# Patient Record
Sex: MALE | Race: WHITE | NOT HISPANIC OR LATINO | Employment: OTHER | URBAN - METROPOLITAN AREA
[De-identification: names, ages, dates, MRNs, and addresses within clinical notes are randomized per-mention and may not be internally consistent; named-entity substitution may affect disease eponyms.]

---

## 2021-01-14 DIAGNOSIS — Z23 NEED FOR VACCINATION: ICD-10-CM

## 2021-02-10 ENCOUNTER — IMMUNIZATION (OUTPATIENT)
Dept: FAMILY PLANNING/WOMEN'S HEALTH CLINIC | Facility: IMMUNIZATION CENTER | Age: 76
End: 2021-02-10
Payer: MEDICARE

## 2021-02-10 ENCOUNTER — APPOINTMENT (OUTPATIENT)
Dept: FAMILY PLANNING/WOMEN'S HEALTH CLINIC | Facility: IMMUNIZATION CENTER | Age: 76
End: 2021-02-10
Attending: INTERNAL MEDICINE
Payer: MEDICARE

## 2021-02-10 DIAGNOSIS — Z23 ENCOUNTER FOR VACCINATION: Primary | ICD-10-CM

## 2021-02-10 DIAGNOSIS — Z23 NEED FOR VACCINATION: ICD-10-CM

## 2021-02-10 PROCEDURE — 0001A PFIZER SARS-COV-2 VACCINE: CPT

## 2021-02-10 PROCEDURE — 91300 PFIZER SARS-COV-2 VACCINE: CPT

## 2021-03-03 ENCOUNTER — IMMUNIZATION (OUTPATIENT)
Dept: FAMILY PLANNING/WOMEN'S HEALTH CLINIC | Facility: IMMUNIZATION CENTER | Age: 76
End: 2021-03-03
Attending: INTERNAL MEDICINE
Payer: MEDICARE

## 2021-03-03 DIAGNOSIS — Z23 ENCOUNTER FOR VACCINATION: Primary | ICD-10-CM

## 2021-03-03 PROCEDURE — 0002A PFIZER SARS-COV-2 VACCINE: CPT

## 2021-03-03 PROCEDURE — 91300 PFIZER SARS-COV-2 VACCINE: CPT

## 2022-01-01 ENCOUNTER — HOSPITAL ENCOUNTER (INPATIENT)
Facility: MEDICAL CENTER | Age: 77
LOS: 7 days | DRG: 432 | End: 2022-04-02
Attending: EMERGENCY MEDICINE | Admitting: FAMILY MEDICINE
Payer: MEDICARE

## 2022-01-01 ENCOUNTER — APPOINTMENT (OUTPATIENT)
Dept: RADIOLOGY | Facility: MEDICAL CENTER | Age: 77
DRG: 432 | End: 2022-01-01
Attending: STUDENT IN AN ORGANIZED HEALTH CARE EDUCATION/TRAINING PROGRAM
Payer: MEDICARE

## 2022-01-01 ENCOUNTER — APPOINTMENT (OUTPATIENT)
Dept: CARDIOLOGY | Facility: MEDICAL CENTER | Age: 77
DRG: 432 | End: 2022-01-01
Attending: STUDENT IN AN ORGANIZED HEALTH CARE EDUCATION/TRAINING PROGRAM
Payer: MEDICARE

## 2022-01-01 ENCOUNTER — APPOINTMENT (OUTPATIENT)
Dept: RADIOLOGY | Facility: MEDICAL CENTER | Age: 77
DRG: 432 | End: 2022-01-01
Attending: FAMILY MEDICINE
Payer: MEDICARE

## 2022-01-01 ENCOUNTER — APPOINTMENT (OUTPATIENT)
Dept: RADIOLOGY | Facility: MEDICAL CENTER | Age: 77
DRG: 432 | End: 2022-01-01
Attending: EMERGENCY MEDICINE
Payer: MEDICARE

## 2022-01-01 VITALS
BODY MASS INDEX: 25.73 KG/M2 | HEART RATE: 83 BPM | WEIGHT: 190 LBS | TEMPERATURE: 98.2 F | RESPIRATION RATE: 19 BRPM | SYSTOLIC BLOOD PRESSURE: 105 MMHG | DIASTOLIC BLOOD PRESSURE: 67 MMHG | HEIGHT: 72 IN | OXYGEN SATURATION: 93 %

## 2022-01-01 DIAGNOSIS — R29.6 FREQUENT FALLS: ICD-10-CM

## 2022-01-01 DIAGNOSIS — K74.60 HEPATIC CIRRHOSIS, UNSPECIFIED HEPATIC CIRRHOSIS TYPE, UNSPECIFIED WHETHER ASCITES PRESENT (HCC): ICD-10-CM

## 2022-01-01 DIAGNOSIS — K21.9 CHRONIC GERD: ICD-10-CM

## 2022-01-01 DIAGNOSIS — R18.8 OTHER ASCITES: ICD-10-CM

## 2022-01-01 DIAGNOSIS — R26.2 UNABLE TO WALK: ICD-10-CM

## 2022-01-01 DIAGNOSIS — G45.9 TIA (TRANSIENT ISCHEMIC ATTACK): ICD-10-CM

## 2022-01-01 DIAGNOSIS — R53.1 WEAKNESS: ICD-10-CM

## 2022-01-01 DIAGNOSIS — D64.9 ANEMIA, UNSPECIFIED TYPE: ICD-10-CM

## 2022-01-01 LAB
ABO + RH BLD: NORMAL
ABO GROUP BLD: NORMAL
ALBUMIN FLD-MCNC: 0.7 G/DL
ALBUMIN SERPL BCP-MCNC: 1.9 G/DL (ref 3.2–4.9)
ALBUMIN SERPL BCP-MCNC: 1.9 G/DL (ref 3.2–4.9)
ALBUMIN SERPL BCP-MCNC: 2 G/DL (ref 3.2–4.9)
ALBUMIN SERPL BCP-MCNC: 2.1 G/DL (ref 3.2–4.9)
ALBUMIN SERPL BCP-MCNC: 2.2 G/DL (ref 3.2–4.9)
ALBUMIN SERPL BCP-MCNC: 2.5 G/DL (ref 3.2–4.9)
ALBUMIN SERPL BCP-MCNC: 2.6 G/DL (ref 3.2–4.9)
ALBUMIN/GLOB SERPL: 0.6 G/DL
ALBUMIN/GLOB SERPL: 0.7 G/DL
ALBUMIN/GLOB SERPL: 0.7 G/DL
ALP SERPL-CCNC: 117 U/L (ref 30–99)
ALP SERPL-CCNC: 54 U/L (ref 30–99)
ALP SERPL-CCNC: 57 U/L (ref 30–99)
ALP SERPL-CCNC: 58 U/L (ref 30–99)
ALP SERPL-CCNC: 71 U/L (ref 30–99)
ALP SERPL-CCNC: 74 U/L (ref 30–99)
ALP SERPL-CCNC: 77 U/L (ref 30–99)
ALP SERPL-CCNC: 85 U/L (ref 30–99)
ALP SERPL-CCNC: 87 U/L (ref 30–99)
ALT SERPL-CCNC: 10 U/L (ref 2–50)
ALT SERPL-CCNC: 10 U/L (ref 2–50)
ALT SERPL-CCNC: 11 U/L (ref 2–50)
ALT SERPL-CCNC: 17 U/L (ref 2–50)
ALT SERPL-CCNC: 7 U/L (ref 2–50)
ALT SERPL-CCNC: 8 U/L (ref 2–50)
ALT SERPL-CCNC: 9 U/L (ref 2–50)
AMMONIA PLAS-SCNC: 35 UMOL/L (ref 11–45)
AMPHET UR QL SCN: NEGATIVE
ANION GAP SERPL CALC-SCNC: 14 MMOL/L (ref 7–16)
ANION GAP SERPL CALC-SCNC: 4 MMOL/L (ref 7–16)
ANION GAP SERPL CALC-SCNC: 5 MMOL/L (ref 7–16)
ANION GAP SERPL CALC-SCNC: 5 MMOL/L (ref 7–16)
ANION GAP SERPL CALC-SCNC: 6 MMOL/L (ref 7–16)
ANION GAP SERPL CALC-SCNC: 8 MMOL/L (ref 7–16)
ANION GAP SERPL CALC-SCNC: 9 MMOL/L (ref 7–16)
ANION GAP SERPL CALC-SCNC: 9 MMOL/L (ref 7–16)
APPEARANCE FLD: CLEAR
APTT PPP: 34 SEC (ref 24.7–36)
AST SERPL-CCNC: 18 U/L (ref 12–45)
AST SERPL-CCNC: 18 U/L (ref 12–45)
AST SERPL-CCNC: 19 U/L (ref 12–45)
AST SERPL-CCNC: 19 U/L (ref 12–45)
AST SERPL-CCNC: 21 U/L (ref 12–45)
AST SERPL-CCNC: 24 U/L (ref 12–45)
AST SERPL-CCNC: 27 U/L (ref 12–45)
AST SERPL-CCNC: 32 U/L (ref 12–45)
AST SERPL-CCNC: 37 U/L (ref 12–45)
BACTERIA FLD AEROBE CULT: NORMAL
BARBITURATES UR QL SCN: NEGATIVE
BASOPHILS # BLD AUTO: 0 % (ref 0–1.8)
BASOPHILS # BLD AUTO: 0.2 % (ref 0–1.8)
BASOPHILS # BLD AUTO: 0.2 % (ref 0–1.8)
BASOPHILS # BLD: 0 K/UL (ref 0–0.12)
BASOPHILS # BLD: 0.01 K/UL (ref 0–0.12)
BASOPHILS # BLD: 0.02 K/UL (ref 0–0.12)
BASOPHILS NFR FLD: 1 %
BENZODIAZ UR QL SCN: NEGATIVE
BILIRUB SERPL-MCNC: 0.6 MG/DL (ref 0.1–1.5)
BILIRUB SERPL-MCNC: 0.7 MG/DL (ref 0.1–1.5)
BILIRUB SERPL-MCNC: 0.7 MG/DL (ref 0.1–1.5)
BILIRUB SERPL-MCNC: 0.8 MG/DL (ref 0.1–1.5)
BILIRUB SERPL-MCNC: 0.9 MG/DL (ref 0.1–1.5)
BILIRUB SERPL-MCNC: 1.1 MG/DL (ref 0.1–1.5)
BILIRUB SERPL-MCNC: 1.2 MG/DL (ref 0.1–1.5)
BILIRUB SERPL-MCNC: 1.3 MG/DL (ref 0.1–1.5)
BILIRUB SERPL-MCNC: 1.7 MG/DL (ref 0.1–1.5)
BLD GP AB SCN SERPL QL: NORMAL
BODY FLD TYPE: NORMAL
BODY FLD TYPE: NORMAL
BUN SERPL-MCNC: 14 MG/DL (ref 8–22)
BUN SERPL-MCNC: 20 MG/DL (ref 8–22)
BUN SERPL-MCNC: 21 MG/DL (ref 8–22)
BUN SERPL-MCNC: 21 MG/DL (ref 8–22)
BUN SERPL-MCNC: 23 MG/DL (ref 8–22)
BUN SERPL-MCNC: 25 MG/DL (ref 8–22)
BUN SERPL-MCNC: 29 MG/DL (ref 8–22)
BUN SERPL-MCNC: 31 MG/DL (ref 8–22)
BUN SERPL-MCNC: 34 MG/DL (ref 8–22)
BUN SERPL-MCNC: 37 MG/DL (ref 8–22)
BZE UR QL SCN: NEGATIVE
CALCIUM SERPL-MCNC: 7.7 MG/DL (ref 8.5–10.5)
CALCIUM SERPL-MCNC: 8 MG/DL (ref 8.5–10.5)
CALCIUM SERPL-MCNC: 8 MG/DL (ref 8.5–10.5)
CALCIUM SERPL-MCNC: 8.1 MG/DL (ref 8.5–10.5)
CALCIUM SERPL-MCNC: 8.2 MG/DL (ref 8.5–10.5)
CALCIUM SERPL-MCNC: 8.3 MG/DL (ref 8.5–10.5)
CALCIUM SERPL-MCNC: 8.6 MG/DL (ref 8.5–10.5)
CALCIUM SERPL-MCNC: 8.9 MG/DL (ref 8.5–10.5)
CANNABINOIDS UR QL SCN: NEGATIVE
CHLORIDE SERPL-SCNC: 101 MMOL/L (ref 96–112)
CHLORIDE SERPL-SCNC: 102 MMOL/L (ref 96–112)
CHLORIDE SERPL-SCNC: 102 MMOL/L (ref 96–112)
CHLORIDE SERPL-SCNC: 103 MMOL/L (ref 96–112)
CHLORIDE SERPL-SCNC: 104 MMOL/L (ref 96–112)
CHLORIDE SERPL-SCNC: 105 MMOL/L (ref 96–112)
CHLORIDE SERPL-SCNC: 106 MMOL/L (ref 96–112)
CHLORIDE SERPL-SCNC: 106 MMOL/L (ref 96–112)
CHLORIDE SERPL-SCNC: 107 MMOL/L (ref 96–112)
CHLORIDE SERPL-SCNC: 98 MMOL/L (ref 96–112)
CO2 SERPL-SCNC: 23 MMOL/L (ref 20–33)
CO2 SERPL-SCNC: 26 MMOL/L (ref 20–33)
CO2 SERPL-SCNC: 27 MMOL/L (ref 20–33)
CO2 SERPL-SCNC: 28 MMOL/L (ref 20–33)
CO2 SERPL-SCNC: 30 MMOL/L (ref 20–33)
CO2 SERPL-SCNC: 30 MMOL/L (ref 20–33)
COLOR FLD: YELLOW
CREAT SERPL-MCNC: 0.52 MG/DL (ref 0.5–1.4)
CREAT SERPL-MCNC: 0.58 MG/DL (ref 0.5–1.4)
CREAT SERPL-MCNC: 0.71 MG/DL (ref 0.5–1.4)
CREAT SERPL-MCNC: 0.81 MG/DL (ref 0.5–1.4)
CREAT SERPL-MCNC: 0.81 MG/DL (ref 0.5–1.4)
CREAT SERPL-MCNC: 0.82 MG/DL (ref 0.5–1.4)
CREAT SERPL-MCNC: 0.83 MG/DL (ref 0.5–1.4)
CREAT SERPL-MCNC: 0.85 MG/DL (ref 0.5–1.4)
CRP SERPL HS-MCNC: 11.23 MG/DL (ref 0–0.75)
CSF COMMENTS 1658: NORMAL
EOSINOPHIL # BLD AUTO: 0 K/UL (ref 0–0.51)
EOSINOPHIL # BLD AUTO: 0.02 K/UL (ref 0–0.51)
EOSINOPHIL # BLD AUTO: 0.02 K/UL (ref 0–0.51)
EOSINOPHIL NFR BLD: 0 % (ref 0–6.9)
EOSINOPHIL NFR BLD: 0.3 % (ref 0–6.9)
EOSINOPHIL NFR BLD: 0.3 % (ref 0–6.9)
ERYTHROCYTE [DISTWIDTH] IN BLOOD BY AUTOMATED COUNT: 50.8 FL (ref 35.9–50)
ERYTHROCYTE [DISTWIDTH] IN BLOOD BY AUTOMATED COUNT: 52.7 FL (ref 35.9–50)
ERYTHROCYTE [DISTWIDTH] IN BLOOD BY AUTOMATED COUNT: 52.8 FL (ref 35.9–50)
ERYTHROCYTE [DISTWIDTH] IN BLOOD BY AUTOMATED COUNT: 52.9 FL (ref 35.9–50)
ERYTHROCYTE [DISTWIDTH] IN BLOOD BY AUTOMATED COUNT: 53.3 FL (ref 35.9–50)
ERYTHROCYTE [DISTWIDTH] IN BLOOD BY AUTOMATED COUNT: 53.4 FL (ref 35.9–50)
ERYTHROCYTE [DISTWIDTH] IN BLOOD BY AUTOMATED COUNT: 54.2 FL (ref 35.9–50)
ERYTHROCYTE [DISTWIDTH] IN BLOOD BY AUTOMATED COUNT: 54.8 FL (ref 35.9–50)
ERYTHROCYTE [DISTWIDTH] IN BLOOD BY AUTOMATED COUNT: 56 FL (ref 35.9–50)
ETHANOL BLD-MCNC: <10.1 MG/DL (ref 0–10)
GFR SERPLBLD CREATININE-BSD FMLA CKD-EPI: 100 ML/MIN/1.73 M 2
GFR SERPLBLD CREATININE-BSD FMLA CKD-EPI: 104 ML/MIN/1.73 M 2
GFR SERPLBLD CREATININE-BSD FMLA CKD-EPI: 89 ML/MIN/1.73 M 2
GFR SERPLBLD CREATININE-BSD FMLA CKD-EPI: 90 ML/MIN/1.73 M 2
GFR SERPLBLD CREATININE-BSD FMLA CKD-EPI: 90 ML/MIN/1.73 M 2
GFR SERPLBLD CREATININE-BSD FMLA CKD-EPI: 91 ML/MIN/1.73 M 2
GFR SERPLBLD CREATININE-BSD FMLA CKD-EPI: 91 ML/MIN/1.73 M 2
GFR SERPLBLD CREATININE-BSD FMLA CKD-EPI: 94 ML/MIN/1.73 M 2
GLOBULIN SER CALC-MCNC: 3.3 G/DL (ref 1.9–3.5)
GLOBULIN SER CALC-MCNC: 3.5 G/DL (ref 1.9–3.5)
GLOBULIN SER CALC-MCNC: 3.5 G/DL (ref 1.9–3.5)
GLOBULIN SER CALC-MCNC: 3.7 G/DL (ref 1.9–3.5)
GLOBULIN SER CALC-MCNC: 3.9 G/DL (ref 1.9–3.5)
GLOBULIN SER CALC-MCNC: 4.7 G/DL (ref 1.9–3.5)
GLUCOSE SERPL-MCNC: 100 MG/DL (ref 65–99)
GLUCOSE SERPL-MCNC: 102 MG/DL (ref 65–99)
GLUCOSE SERPL-MCNC: 116 MG/DL (ref 65–99)
GLUCOSE SERPL-MCNC: 116 MG/DL (ref 65–99)
GLUCOSE SERPL-MCNC: 126 MG/DL (ref 65–99)
GLUCOSE SERPL-MCNC: 139 MG/DL (ref 65–99)
GLUCOSE SERPL-MCNC: 152 MG/DL (ref 65–99)
GLUCOSE SERPL-MCNC: 153 MG/DL (ref 65–99)
GLUCOSE SERPL-MCNC: 80 MG/DL (ref 65–99)
GLUCOSE SERPL-MCNC: 97 MG/DL (ref 65–99)
GRAM STN SPEC: NORMAL
GRAM STN SPEC: NORMAL
HCT VFR BLD AUTO: 23.7 % (ref 42–52)
HCT VFR BLD AUTO: 23.8 % (ref 42–52)
HCT VFR BLD AUTO: 24 % (ref 42–52)
HCT VFR BLD AUTO: 24.5 % (ref 42–52)
HCT VFR BLD AUTO: 26.4 % (ref 42–52)
HCT VFR BLD AUTO: 26.4 % (ref 42–52)
HCT VFR BLD AUTO: 27 % (ref 42–52)
HCT VFR BLD AUTO: 27.1 % (ref 42–52)
HCT VFR BLD AUTO: 36.9 % (ref 42–52)
HGB BLD-MCNC: 12.2 G/DL (ref 14–18)
HGB BLD-MCNC: 7.6 G/DL (ref 14–18)
HGB BLD-MCNC: 7.7 G/DL (ref 14–18)
HGB BLD-MCNC: 7.9 G/DL (ref 14–18)
HGB BLD-MCNC: 7.9 G/DL (ref 14–18)
HGB BLD-MCNC: 8.5 G/DL (ref 14–18)
HGB BLD-MCNC: 8.6 G/DL (ref 14–18)
HGB BLD-MCNC: 8.6 G/DL (ref 14–18)
HGB BLD-MCNC: 8.7 G/DL (ref 14–18)
HISTIOCYTES NFR FLD: 3 %
IMM GRANULOCYTES # BLD AUTO: 0.01 K/UL (ref 0–0.11)
IMM GRANULOCYTES # BLD AUTO: 0.02 K/UL (ref 0–0.11)
IMM GRANULOCYTES # BLD AUTO: 0.04 K/UL (ref 0–0.11)
IMM GRANULOCYTES NFR BLD AUTO: 0.2 % (ref 0–0.9)
IMM GRANULOCYTES NFR BLD AUTO: 0.3 % (ref 0–0.9)
IMM GRANULOCYTES NFR BLD AUTO: 0.4 % (ref 0–0.9)
INR PPP: 1.34 (ref 0.87–1.13)
LDH FLD L TO P-CCNC: 54 U/L
LDH SERPL L TO P-CCNC: 210 U/L (ref 107–266)
LV EJECT FRACT  99904: 65
LV EJECT FRACT MOD 4C 99902: 70.8
LYMPHOCYTES # BLD AUTO: 0.61 K/UL (ref 1–4.8)
LYMPHOCYTES # BLD AUTO: 1.14 K/UL (ref 1–4.8)
LYMPHOCYTES # BLD AUTO: 1.18 K/UL (ref 1–4.8)
LYMPHOCYTES NFR BLD: 18.3 % (ref 22–41)
LYMPHOCYTES NFR BLD: 19.5 % (ref 22–41)
LYMPHOCYTES NFR BLD: 5.9 % (ref 22–41)
LYMPHOCYTES NFR FLD: 67 %
MAGNESIUM SERPL-MCNC: 1.9 MG/DL (ref 1.5–2.5)
MAGNESIUM SERPL-MCNC: 1.9 MG/DL (ref 1.5–2.5)
MAGNESIUM SERPL-MCNC: 2 MG/DL (ref 1.5–2.5)
MAGNESIUM SERPL-MCNC: 2 MG/DL (ref 1.5–2.5)
MCH RBC QN AUTO: 32.8 PG (ref 27–33)
MCH RBC QN AUTO: 33.1 PG (ref 27–33)
MCH RBC QN AUTO: 33.1 PG (ref 27–33)
MCH RBC QN AUTO: 33.2 PG (ref 27–33)
MCH RBC QN AUTO: 33.2 PG (ref 27–33)
MCH RBC QN AUTO: 33.3 PG (ref 27–33)
MCH RBC QN AUTO: 33.3 PG (ref 27–33)
MCH RBC QN AUTO: 33.6 PG (ref 27–33)
MCH RBC QN AUTO: 33.8 PG (ref 27–33)
MCHC RBC AUTO-ENTMCNC: 31.4 G/DL (ref 33.7–35.3)
MCHC RBC AUTO-ENTMCNC: 31.9 G/DL (ref 33.7–35.3)
MCHC RBC AUTO-ENTMCNC: 32.1 G/DL (ref 33.7–35.3)
MCHC RBC AUTO-ENTMCNC: 32.1 G/DL (ref 33.7–35.3)
MCHC RBC AUTO-ENTMCNC: 32.2 G/DL (ref 33.7–35.3)
MCHC RBC AUTO-ENTMCNC: 32.6 G/DL (ref 33.7–35.3)
MCHC RBC AUTO-ENTMCNC: 32.9 G/DL (ref 33.7–35.3)
MCHC RBC AUTO-ENTMCNC: 33.1 G/DL (ref 33.7–35.3)
MCHC RBC AUTO-ENTMCNC: 33.2 G/DL (ref 33.7–35.3)
MCV RBC AUTO: 100.5 FL (ref 81.4–97.8)
MCV RBC AUTO: 102.3 FL (ref 81.4–97.8)
MCV RBC AUTO: 102.3 FL (ref 81.4–97.8)
MCV RBC AUTO: 102.6 FL (ref 81.4–97.8)
MCV RBC AUTO: 102.7 FL (ref 81.4–97.8)
MCV RBC AUTO: 103.5 FL (ref 81.4–97.8)
MCV RBC AUTO: 104.7 FL (ref 81.4–97.8)
MCV RBC AUTO: 107 FL (ref 81.4–97.8)
MCV RBC AUTO: 99.6 FL (ref 81.4–97.8)
METHADONE UR QL SCN: NEGATIVE
MONOCYTES # BLD AUTO: 0.43 K/UL (ref 0–0.85)
MONOCYTES # BLD AUTO: 0.53 K/UL (ref 0–0.85)
MONOCYTES # BLD AUTO: 0.65 K/UL (ref 0–0.85)
MONOCYTES NFR BLD AUTO: 6.3 % (ref 0–13.4)
MONOCYTES NFR BLD AUTO: 6.7 % (ref 0–13.4)
MONOCYTES NFR BLD AUTO: 9 % (ref 0–13.4)
MONONUC CELLS NFR FLD: 23 %
NEUTROPHILS # BLD AUTO: 4.15 K/UL (ref 1.82–7.42)
NEUTROPHILS # BLD AUTO: 4.81 K/UL (ref 1.82–7.42)
NEUTROPHILS # BLD AUTO: 8.99 K/UL (ref 1.82–7.42)
NEUTROPHILS NFR BLD: 70.8 % (ref 44–72)
NEUTROPHILS NFR BLD: 74.4 % (ref 44–72)
NEUTROPHILS NFR BLD: 87.2 % (ref 44–72)
NEUTROPHILS NFR FLD: 6 %
NRBC # BLD AUTO: 0 K/UL
NRBC BLD-RTO: 0 /100 WBC
NT-PROBNP SERPL IA-MCNC: 685 PG/ML (ref 0–125)
OPIATES UR QL SCN: NEGATIVE
OXYCODONE UR QL SCN: NEGATIVE
PCP UR QL SCN: NEGATIVE
PHOSPHATE SERPL-MCNC: 2.1 MG/DL (ref 2.5–4.5)
PHOSPHATE SERPL-MCNC: 2.1 MG/DL (ref 2.5–4.5)
PHOSPHATE SERPL-MCNC: 2.4 MG/DL (ref 2.5–4.5)
PHOSPHATE SERPL-MCNC: 2.9 MG/DL (ref 2.5–4.5)
PLATELET # BLD AUTO: 178 K/UL (ref 164–446)
PLATELET # BLD AUTO: 189 K/UL (ref 164–446)
PLATELET # BLD AUTO: 192 K/UL (ref 164–446)
PLATELET # BLD AUTO: 199 K/UL (ref 164–446)
PLATELET # BLD AUTO: 201 K/UL (ref 164–446)
PLATELET # BLD AUTO: 201 K/UL (ref 164–446)
PLATELET # BLD AUTO: 219 K/UL (ref 164–446)
PLATELET # BLD AUTO: 224 K/UL (ref 164–446)
PLATELET # BLD AUTO: 275 K/UL (ref 164–446)
PMV BLD AUTO: 8.8 FL (ref 9–12.9)
PMV BLD AUTO: 9.3 FL (ref 9–12.9)
PMV BLD AUTO: 9.4 FL (ref 9–12.9)
PMV BLD AUTO: 9.5 FL (ref 9–12.9)
PMV BLD AUTO: 9.6 FL (ref 9–12.9)
PMV BLD AUTO: 9.7 FL (ref 9–12.9)
PMV BLD AUTO: 9.7 FL (ref 9–12.9)
POTASSIUM SERPL-SCNC: 3.3 MMOL/L (ref 3.6–5.5)
POTASSIUM SERPL-SCNC: 3.5 MMOL/L (ref 3.6–5.5)
POTASSIUM SERPL-SCNC: 3.6 MMOL/L (ref 3.6–5.5)
POTASSIUM SERPL-SCNC: 3.7 MMOL/L (ref 3.6–5.5)
POTASSIUM SERPL-SCNC: 3.8 MMOL/L (ref 3.6–5.5)
POTASSIUM SERPL-SCNC: 3.9 MMOL/L (ref 3.6–5.5)
POTASSIUM SERPL-SCNC: 4 MMOL/L (ref 3.6–5.5)
POTASSIUM SERPL-SCNC: 4 MMOL/L (ref 3.6–5.5)
POTASSIUM SERPL-SCNC: 4.1 MMOL/L (ref 3.6–5.5)
POTASSIUM SERPL-SCNC: 4.1 MMOL/L (ref 3.6–5.5)
PREALB SERPL-MCNC: <3 MG/DL (ref 18–38)
PROPOXYPH UR QL SCN: NEGATIVE
PROT FLD-MCNC: 1.9 G/DL
PROT SERPL-MCNC: 5.2 G/DL (ref 6–8.2)
PROT SERPL-MCNC: 5.2 G/DL (ref 6–8.2)
PROT SERPL-MCNC: 5.3 G/DL (ref 6–8.2)
PROT SERPL-MCNC: 5.5 G/DL (ref 6–8.2)
PROT SERPL-MCNC: 5.6 G/DL (ref 6–8.2)
PROT SERPL-MCNC: 5.7 G/DL (ref 6–8.2)
PROT SERPL-MCNC: 6.1 G/DL (ref 6–8.2)
PROT SERPL-MCNC: 6.2 G/DL (ref 6–8.2)
PROT SERPL-MCNC: 7.3 G/DL (ref 6–8.2)
PROTHROMBIN TIME: 16.2 SEC (ref 12–14.6)
RBC # BLD AUTO: 2.29 M/UL (ref 4.7–6.1)
RBC # BLD AUTO: 2.29 M/UL (ref 4.7–6.1)
RBC # BLD AUTO: 2.34 M/UL (ref 4.7–6.1)
RBC # BLD AUTO: 2.39 M/UL (ref 4.7–6.1)
RBC # BLD AUTO: 2.57 M/UL (ref 4.7–6.1)
RBC # BLD AUTO: 2.58 M/UL (ref 4.7–6.1)
RBC # BLD AUTO: 2.58 M/UL (ref 4.7–6.1)
RBC # BLD AUTO: 2.65 M/UL (ref 4.7–6.1)
RBC # BLD AUTO: 3.67 M/UL (ref 4.7–6.1)
RBC # FLD: <2000 CELLS/UL
RH BLD: NORMAL
SIGNIFICANT IND 70042: NORMAL
SIGNIFICANT IND 70042: NORMAL
SITE SITE: NORMAL
SITE SITE: NORMAL
SODIUM SERPL-SCNC: 135 MMOL/L (ref 135–145)
SODIUM SERPL-SCNC: 135 MMOL/L (ref 135–145)
SODIUM SERPL-SCNC: 136 MMOL/L (ref 135–145)
SODIUM SERPL-SCNC: 138 MMOL/L (ref 135–145)
SODIUM SERPL-SCNC: 139 MMOL/L (ref 135–145)
SODIUM SERPL-SCNC: 139 MMOL/L (ref 135–145)
SODIUM SERPL-SCNC: 140 MMOL/L (ref 135–145)
SODIUM SERPL-SCNC: 142 MMOL/L (ref 135–145)
SOURCE SOURCE: NORMAL
SOURCE SOURCE: NORMAL
WBC # BLD AUTO: 10.3 K/UL (ref 4.8–10.8)
WBC # BLD AUTO: 4.9 K/UL (ref 4.8–10.8)
WBC # BLD AUTO: 5 K/UL (ref 4.8–10.8)
WBC # BLD AUTO: 5.5 K/UL (ref 4.8–10.8)
WBC # BLD AUTO: 5.6 K/UL (ref 4.8–10.8)
WBC # BLD AUTO: 5.9 K/UL (ref 4.8–10.8)
WBC # BLD AUTO: 6 K/UL (ref 4.8–10.8)
WBC # BLD AUTO: 6.5 K/UL (ref 4.8–10.8)
WBC # BLD AUTO: 6.8 K/UL (ref 4.8–10.8)
WBC # FLD: 26 CELLS/UL

## 2022-01-01 PROCEDURE — 700111 HCHG RX REV CODE 636 W/ 250 OVERRIDE (IP): Performed by: STUDENT IN AN ORGANIZED HEALTH CARE EDUCATION/TRAINING PROGRAM

## 2022-01-01 PROCEDURE — 86900 BLOOD TYPING SEROLOGIC ABO: CPT

## 2022-01-01 PROCEDURE — 700105 HCHG RX REV CODE 258

## 2022-01-01 PROCEDURE — 83735 ASSAY OF MAGNESIUM: CPT

## 2022-01-01 PROCEDURE — 97162 PT EVAL MOD COMPLEX 30 MIN: CPT

## 2022-01-01 PROCEDURE — 36415 COLL VENOUS BLD VENIPUNCTURE: CPT

## 2022-01-01 PROCEDURE — A9270 NON-COVERED ITEM OR SERVICE: HCPCS | Performed by: STUDENT IN AN ORGANIZED HEALTH CARE EDUCATION/TRAINING PROGRAM

## 2022-01-01 PROCEDURE — 80048 BASIC METABOLIC PNL TOTAL CA: CPT

## 2022-01-01 PROCEDURE — 99232 SBSQ HOSP IP/OBS MODERATE 35: CPT | Mod: GC | Performed by: FAMILY MEDICINE

## 2022-01-01 PROCEDURE — 700102 HCHG RX REV CODE 250 W/ 637 OVERRIDE(OP): Performed by: FAMILY MEDICINE

## 2022-01-01 PROCEDURE — 85730 THROMBOPLASTIN TIME PARTIAL: CPT

## 2022-01-01 PROCEDURE — 84157 ASSAY OF PROTEIN OTHER: CPT

## 2022-01-01 PROCEDURE — 90471 IMMUNIZATION ADMIN: CPT

## 2022-01-01 PROCEDURE — G0378 HOSPITAL OBSERVATION PER HR: HCPCS

## 2022-01-01 PROCEDURE — 85027 COMPLETE CBC AUTOMATED: CPT | Mod: XU

## 2022-01-01 PROCEDURE — 80053 COMPREHEN METABOLIC PANEL: CPT

## 2022-01-01 PROCEDURE — 700102 HCHG RX REV CODE 250 W/ 637 OVERRIDE(OP): Performed by: STUDENT IN AN ORGANIZED HEALTH CARE EDUCATION/TRAINING PROGRAM

## 2022-01-01 PROCEDURE — 700101 HCHG RX REV CODE 250: Performed by: STUDENT IN AN ORGANIZED HEALTH CARE EDUCATION/TRAINING PROGRAM

## 2022-01-01 PROCEDURE — 770001 HCHG ROOM/CARE - MED/SURG/GYN PRIV*

## 2022-01-01 PROCEDURE — 84100 ASSAY OF PHOSPHORUS: CPT

## 2022-01-01 PROCEDURE — P9047 ALBUMIN (HUMAN), 25%, 50ML: HCPCS | Performed by: STUDENT IN AN ORGANIZED HEALTH CARE EDUCATION/TRAINING PROGRAM

## 2022-01-01 PROCEDURE — 99231 SBSQ HOSP IP/OBS SF/LOW 25: CPT | Mod: GC | Performed by: FAMILY MEDICINE

## 2022-01-01 PROCEDURE — 700111 HCHG RX REV CODE 636 W/ 250 OVERRIDE (IP)

## 2022-01-01 PROCEDURE — 700111 HCHG RX REV CODE 636 W/ 250 OVERRIDE (IP): Performed by: EMERGENCY MEDICINE

## 2022-01-01 PROCEDURE — 97116 GAIT TRAINING THERAPY: CPT

## 2022-01-01 PROCEDURE — 700102 HCHG RX REV CODE 250 W/ 637 OVERRIDE(OP)

## 2022-01-01 PROCEDURE — 85027 COMPLETE CBC AUTOMATED: CPT

## 2022-01-01 PROCEDURE — A9270 NON-COVERED ITEM OR SERVICE: HCPCS

## 2022-01-01 PROCEDURE — 97535 SELF CARE MNGMENT TRAINING: CPT

## 2022-01-01 PROCEDURE — 71260 CT THORAX DX C+: CPT | Mod: ME

## 2022-01-01 PROCEDURE — 700105 HCHG RX REV CODE 258: Performed by: STUDENT IN AN ORGANIZED HEALTH CARE EDUCATION/TRAINING PROGRAM

## 2022-01-01 PROCEDURE — 3E0234Z INTRODUCTION OF SERUM, TOXOID AND VACCINE INTO MUSCLE, PERCUTANEOUS APPROACH: ICD-10-PCS | Performed by: FAMILY MEDICINE

## 2022-01-01 PROCEDURE — 85025 COMPLETE CBC W/AUTO DIFF WBC: CPT

## 2022-01-01 PROCEDURE — 92610 EVALUATE SWALLOWING FUNCTION: CPT

## 2022-01-01 PROCEDURE — 86850 RBC ANTIBODY SCREEN: CPT

## 2022-01-01 PROCEDURE — 97166 OT EVAL MOD COMPLEX 45 MIN: CPT

## 2022-01-01 PROCEDURE — 49082 ABD PARACENTESIS: CPT | Mod: GC | Performed by: HOSPITALIST

## 2022-01-01 PROCEDURE — A9270 NON-COVERED ITEM OR SERVICE: HCPCS | Performed by: FAMILY MEDICINE

## 2022-01-01 PROCEDURE — 700111 HCHG RX REV CODE 636 W/ 250 OVERRIDE (IP): Performed by: FAMILY MEDICINE

## 2022-01-01 PROCEDURE — 80307 DRUG TEST PRSMV CHEM ANLYZR: CPT

## 2022-01-01 PROCEDURE — 82042 OTHER SOURCE ALBUMIN QUAN EA: CPT

## 2022-01-01 PROCEDURE — 99285 EMERGENCY DEPT VISIT HI MDM: CPT

## 2022-01-01 PROCEDURE — 97530 THERAPEUTIC ACTIVITIES: CPT

## 2022-01-01 PROCEDURE — 700101 HCHG RX REV CODE 250

## 2022-01-01 PROCEDURE — 83880 ASSAY OF NATRIURETIC PEPTIDE: CPT

## 2022-01-01 PROCEDURE — 99219 PR INITIAL OBSERVATION CARE,LEVL II: CPT | Mod: GC | Performed by: FAMILY MEDICINE

## 2022-01-01 PROCEDURE — 36415 COLL VENOUS BLD VENIPUNCTURE: CPT | Mod: XU

## 2022-01-01 PROCEDURE — C9113 INJ PANTOPRAZOLE SODIUM, VIA: HCPCS

## 2022-01-01 PROCEDURE — 71045 X-RAY EXAM CHEST 1 VIEW: CPT

## 2022-01-01 PROCEDURE — 87015 SPECIMEN INFECT AGNT CONCNTJ: CPT

## 2022-01-01 PROCEDURE — 76700 US EXAM ABDOM COMPLETE: CPT

## 2022-01-01 PROCEDURE — 92526 ORAL FUNCTION THERAPY: CPT

## 2022-01-01 PROCEDURE — 96374 THER/PROPH/DIAG INJ IV PUSH: CPT

## 2022-01-01 PROCEDURE — 700117 HCHG RX CONTRAST REV CODE 255: Performed by: STUDENT IN AN ORGANIZED HEALTH CARE EDUCATION/TRAINING PROGRAM

## 2022-01-01 PROCEDURE — 86901 BLOOD TYPING SEROLOGIC RH(D): CPT

## 2022-01-01 PROCEDURE — 90715 TDAP VACCINE 7 YRS/> IM: CPT | Performed by: EMERGENCY MEDICINE

## 2022-01-01 PROCEDURE — 93306 TTE W/DOPPLER COMPLETE: CPT | Mod: 26 | Performed by: INTERNAL MEDICINE

## 2022-01-01 PROCEDURE — 89051 BODY FLUID CELL COUNT: CPT

## 2022-01-01 PROCEDURE — 96372 THER/PROPH/DIAG INJ SC/IM: CPT | Mod: XU

## 2022-01-01 PROCEDURE — 84134 ASSAY OF PREALBUMIN: CPT

## 2022-01-01 PROCEDURE — 86140 C-REACTIVE PROTEIN: CPT

## 2022-01-01 PROCEDURE — 0W9G3ZZ DRAINAGE OF PERITONEAL CAVITY, PERCUTANEOUS APPROACH: ICD-10-PCS | Performed by: HOSPITALIST

## 2022-01-01 PROCEDURE — 87205 SMEAR GRAM STAIN: CPT

## 2022-01-01 PROCEDURE — 82077 ASSAY SPEC XCP UR&BREATH IA: CPT

## 2022-01-01 PROCEDURE — 83615 LACTATE (LD) (LDH) ENZYME: CPT

## 2022-01-01 PROCEDURE — 82140 ASSAY OF AMMONIA: CPT

## 2022-01-01 PROCEDURE — 85610 PROTHROMBIN TIME: CPT

## 2022-01-01 PROCEDURE — 96372 THER/PROPH/DIAG INJ SC/IM: CPT

## 2022-01-01 PROCEDURE — 97602 WOUND(S) CARE NON-SELECTIVE: CPT

## 2022-01-01 PROCEDURE — 70450 CT HEAD/BRAIN W/O DYE: CPT | Mod: MG

## 2022-01-01 PROCEDURE — 96375 TX/PRO/DX INJ NEW DRUG ADDON: CPT | Mod: XU

## 2022-01-01 PROCEDURE — 93306 TTE W/DOPPLER COMPLETE: CPT

## 2022-01-01 PROCEDURE — 87070 CULTURE OTHR SPECIMN AEROBIC: CPT

## 2022-01-01 PROCEDURE — 96376 TX/PRO/DX INJ SAME DRUG ADON: CPT | Mod: XU

## 2022-01-01 RX ORDER — CHOLECALCIFEROL (VITAMIN D3) 125 MCG
1000 CAPSULE ORAL DAILY
Status: DISCONTINUED | OUTPATIENT
Start: 2022-01-01 | End: 2022-01-01

## 2022-01-01 RX ORDER — POTASSIUM CHLORIDE 20 MEQ/1
40 TABLET, EXTENDED RELEASE ORAL DAILY
Status: DISCONTINUED | OUTPATIENT
Start: 2022-01-01 | End: 2022-01-01

## 2022-01-01 RX ORDER — ALBUMIN (HUMAN) 12.5 G/50ML
25 SOLUTION INTRAVENOUS ONCE
Status: COMPLETED | OUTPATIENT
Start: 2022-01-01 | End: 2022-01-01

## 2022-01-01 RX ORDER — PANTOPRAZOLE SODIUM 40 MG/10ML
20 INJECTION, POWDER, LYOPHILIZED, FOR SOLUTION INTRAVENOUS 2 TIMES DAILY
Status: DISCONTINUED | OUTPATIENT
Start: 2022-01-01 | End: 2022-01-01

## 2022-01-01 RX ORDER — SIMVASTATIN 10 MG
20 TABLET ORAL NIGHTLY
Status: DISCONTINUED | OUTPATIENT
Start: 2022-01-01 | End: 2022-01-01

## 2022-01-01 RX ORDER — OXYCODONE HYDROCHLORIDE 5 MG/1
5 TABLET ORAL EVERY 6 HOURS PRN
Status: DISCONTINUED | OUTPATIENT
Start: 2022-01-01 | End: 2022-01-01

## 2022-01-01 RX ORDER — FUROSEMIDE 10 MG/ML
40 INJECTION INTRAMUSCULAR; INTRAVENOUS
Status: DISCONTINUED | OUTPATIENT
Start: 2022-01-01 | End: 2022-01-01

## 2022-01-01 RX ORDER — FUROSEMIDE 10 MG/ML
20 INJECTION INTRAMUSCULAR; INTRAVENOUS
Status: DISCONTINUED | OUTPATIENT
Start: 2022-01-01 | End: 2022-01-01

## 2022-01-01 RX ORDER — ACETAMINOPHEN 325 MG/1
650 TABLET ORAL EVERY 8 HOURS PRN
Status: DISCONTINUED | OUTPATIENT
Start: 2022-01-01 | End: 2022-01-01

## 2022-01-01 RX ORDER — DEXTROSE AND SODIUM CHLORIDE 5; .45 G/100ML; G/100ML
INJECTION, SOLUTION INTRAVENOUS CONTINUOUS
Status: DISCONTINUED | OUTPATIENT
Start: 2022-01-01 | End: 2022-01-01

## 2022-01-01 RX ORDER — POLYETHYLENE GLYCOL 3350 17 G/17G
1 POWDER, FOR SOLUTION ORAL
Status: DISCONTINUED | OUTPATIENT
Start: 2022-01-01 | End: 2022-01-01

## 2022-01-01 RX ORDER — ONDANSETRON 4 MG/1
8 TABLET, ORALLY DISINTEGRATING ORAL EVERY 8 HOURS PRN
Status: DISCONTINUED | OUTPATIENT
Start: 2022-01-01 | End: 2022-04-03 | Stop reason: HOSPADM

## 2022-01-01 RX ORDER — OMEPRAZOLE 20 MG/1
20 CAPSULE, DELAYED RELEASE ORAL DAILY
Status: DISCONTINUED | OUTPATIENT
Start: 2022-01-01 | End: 2022-01-01

## 2022-01-01 RX ORDER — ONDANSETRON 2 MG/ML
8 INJECTION INTRAMUSCULAR; INTRAVENOUS EVERY 8 HOURS PRN
Status: DISCONTINUED | OUTPATIENT
Start: 2022-01-01 | End: 2022-04-03 | Stop reason: HOSPADM

## 2022-01-01 RX ORDER — PANTOPRAZOLE SODIUM 40 MG/10ML
40 INJECTION, POWDER, LYOPHILIZED, FOR SOLUTION INTRAVENOUS 2 TIMES DAILY
Status: DISCONTINUED | OUTPATIENT
Start: 2022-01-01 | End: 2022-01-01

## 2022-01-01 RX ORDER — SIMVASTATIN 20 MG
20 TABLET ORAL NIGHTLY
Qty: 90 TABLET | Refills: 3 | Status: SHIPPED | OUTPATIENT
Start: 2022-01-01

## 2022-01-01 RX ORDER — SPIRONOLACTONE 100 MG/1
100 TABLET, FILM COATED ORAL
Status: DISCONTINUED | OUTPATIENT
Start: 2022-01-01 | End: 2022-01-01

## 2022-01-01 RX ORDER — HYDROCHLOROTHIAZIDE 12.5 MG/1
25 CAPSULE, GELATIN COATED ORAL EVERY MORNING
Qty: 30 CAPSULE | Refills: 0 | Status: SHIPPED | OUTPATIENT
Start: 2022-01-01

## 2022-01-01 RX ORDER — LORAZEPAM 2 MG/ML
1 CONCENTRATE ORAL
Status: DISCONTINUED | OUTPATIENT
Start: 2022-01-01 | End: 2022-04-03 | Stop reason: HOSPADM

## 2022-01-01 RX ORDER — HYDROMORPHONE HYDROCHLORIDE 1 MG/ML
0.5 INJECTION, SOLUTION INTRAMUSCULAR; INTRAVENOUS; SUBCUTANEOUS EVERY 4 HOURS PRN
Status: DISCONTINUED | OUTPATIENT
Start: 2022-01-01 | End: 2022-01-01

## 2022-01-01 RX ORDER — HALOPERIDOL 2 MG/ML
1 SOLUTION ORAL EVERY 6 HOURS PRN
Status: DISCONTINUED | OUTPATIENT
Start: 2022-01-01 | End: 2022-04-03 | Stop reason: HOSPADM

## 2022-01-01 RX ORDER — GAUZE BANDAGE 2" X 2"
100 BANDAGE TOPICAL DAILY
Status: DISCONTINUED | OUTPATIENT
Start: 2022-01-01 | End: 2022-01-01

## 2022-01-01 RX ORDER — ACETAMINOPHEN 325 MG/1
650 TABLET ORAL EVERY 4 HOURS PRN
Status: DISCONTINUED | OUTPATIENT
Start: 2022-01-01 | End: 2022-04-03 | Stop reason: HOSPADM

## 2022-01-01 RX ORDER — LISINOPRIL 5 MG/1
5 TABLET ORAL DAILY
Status: DISCONTINUED | OUTPATIENT
Start: 2022-01-01 | End: 2022-01-01

## 2022-01-01 RX ORDER — TERAZOSIN 5 MG/1
10 CAPSULE ORAL NIGHTLY
Status: DISCONTINUED | OUTPATIENT
Start: 2022-01-01 | End: 2022-01-01

## 2022-01-01 RX ORDER — HYDROCHLOROTHIAZIDE 12.5 MG/1
12.5 TABLET ORAL DAILY
Refills: 0 | Status: DISCONTINUED | OUTPATIENT
Start: 2022-01-01 | End: 2022-01-01

## 2022-01-01 RX ORDER — TERAZOSIN 10 MG/1
10 CAPSULE ORAL NIGHTLY
Qty: 90 CAPSULE | Refills: 3 | Status: SHIPPED | OUTPATIENT
Start: 2022-01-01

## 2022-01-01 RX ORDER — METOPROLOL TARTRATE 50 MG/1
50 TABLET, FILM COATED ORAL DAILY
Status: DISCONTINUED | OUTPATIENT
Start: 2022-01-01 | End: 2022-01-01

## 2022-01-01 RX ORDER — BISACODYL 10 MG
10 SUPPOSITORY, RECTAL RECTAL
Status: DISCONTINUED | OUTPATIENT
Start: 2022-01-01 | End: 2022-01-01

## 2022-01-01 RX ORDER — HYDROMORPHONE HYDROCHLORIDE 1 MG/ML
0.5 INJECTION, SOLUTION INTRAMUSCULAR; INTRAVENOUS; SUBCUTANEOUS
Status: DISCONTINUED | OUTPATIENT
Start: 2022-01-01 | End: 2022-01-01

## 2022-01-01 RX ORDER — SCOLOPAMINE TRANSDERMAL SYSTEM 1 MG/1
1 PATCH, EXTENDED RELEASE TRANSDERMAL
Status: DISCONTINUED | OUTPATIENT
Start: 2022-01-01 | End: 2022-04-03 | Stop reason: HOSPADM

## 2022-01-01 RX ORDER — HALOPERIDOL 5 MG/ML
1 INJECTION INTRAMUSCULAR EVERY 6 HOURS PRN
Status: DISCONTINUED | OUTPATIENT
Start: 2022-01-01 | End: 2022-04-03 | Stop reason: HOSPADM

## 2022-01-01 RX ORDER — ACETAMINOPHEN 325 MG/1
650 TABLET ORAL EVERY 6 HOURS PRN
Status: DISCONTINUED | OUTPATIENT
Start: 2022-01-01 | End: 2022-01-01

## 2022-01-01 RX ORDER — LORAZEPAM 2 MG/ML
1 INJECTION INTRAMUSCULAR
Status: DISCONTINUED | OUTPATIENT
Start: 2022-01-01 | End: 2022-04-03 | Stop reason: HOSPADM

## 2022-01-01 RX ORDER — LABETALOL HYDROCHLORIDE 5 MG/ML
10 INJECTION, SOLUTION INTRAVENOUS EVERY 4 HOURS PRN
Status: DISCONTINUED | OUTPATIENT
Start: 2022-01-01 | End: 2022-01-01

## 2022-01-01 RX ORDER — ATROPINE SULFATE 10 MG/ML
1 SOLUTION/ DROPS OPHTHALMIC 2 TIMES DAILY PRN
Status: DISCONTINUED | OUTPATIENT
Start: 2022-01-01 | End: 2022-01-01

## 2022-01-01 RX ORDER — AMOXICILLIN 250 MG
2 CAPSULE ORAL 2 TIMES DAILY
Status: DISCONTINUED | OUTPATIENT
Start: 2022-01-01 | End: 2022-01-01

## 2022-01-01 RX ORDER — FOLIC ACID 1 MG/1
1 TABLET ORAL DAILY
Status: DISCONTINUED | OUTPATIENT
Start: 2022-01-01 | End: 2022-01-01

## 2022-01-01 RX ORDER — SODIUM CHLORIDE 9 MG/ML
INJECTION, SOLUTION INTRAVENOUS CONTINUOUS
Status: DISCONTINUED | OUTPATIENT
Start: 2022-01-01 | End: 2022-01-01

## 2022-01-01 RX ORDER — MORPHINE SULFATE 10 MG/ML
10 INJECTION, SOLUTION INTRAMUSCULAR; INTRAVENOUS
Status: DISCONTINUED | OUTPATIENT
Start: 2022-01-01 | End: 2022-04-03 | Stop reason: HOSPADM

## 2022-01-01 RX ORDER — MORPHINE SULFATE 10 MG/ML
5 INJECTION, SOLUTION INTRAMUSCULAR; INTRAVENOUS
Status: DISCONTINUED | OUTPATIENT
Start: 2022-01-01 | End: 2022-04-03 | Stop reason: HOSPADM

## 2022-01-01 RX ORDER — ASPIRIN AND DIPYRIDAMOLE 25; 200 MG/1; MG/1
1 CAPSULE, EXTENDED RELEASE ORAL 2 TIMES DAILY
Status: DISCONTINUED | OUTPATIENT
Start: 2022-01-01 | End: 2022-01-01

## 2022-01-01 RX ORDER — SPIRONOLACTONE 50 MG/1
50 TABLET, FILM COATED ORAL
Status: DISCONTINUED | OUTPATIENT
Start: 2022-01-01 | End: 2022-01-01

## 2022-01-01 RX ORDER — ATROPINE SULFATE 10 MG/ML
2 SOLUTION/ DROPS OPHTHALMIC EVERY 4 HOURS PRN
Status: DISCONTINUED | OUTPATIENT
Start: 2022-01-01 | End: 2022-04-03 | Stop reason: HOSPADM

## 2022-01-01 RX ORDER — OMEPRAZOLE 20 MG/1
20 CAPSULE, DELAYED RELEASE ORAL DAILY
Qty: 90 CAPSULE | Refills: 3 | Status: SHIPPED | OUTPATIENT
Start: 2022-01-01

## 2022-01-01 RX ORDER — ACETAMINOPHEN 650 MG/1
650 SUPPOSITORY RECTAL EVERY 4 HOURS PRN
Status: DISCONTINUED | OUTPATIENT
Start: 2022-01-01 | End: 2022-04-03 | Stop reason: HOSPADM

## 2022-01-01 RX ORDER — POLYVINYL ALCOHOL 14 MG/ML
2 SOLUTION/ DROPS OPHTHALMIC EVERY 6 HOURS PRN
Status: DISCONTINUED | OUTPATIENT
Start: 2022-01-01 | End: 2022-04-03 | Stop reason: HOSPADM

## 2022-01-01 RX ADMIN — THIAMINE HCL TAB 100 MG 100 MG: 100 TAB at 05:42

## 2022-01-01 RX ADMIN — HYDROMORPHONE HYDROCHLORIDE 0.5 MG: 1 INJECTION, SOLUTION INTRAMUSCULAR; INTRAVENOUS; SUBCUTANEOUS at 12:11

## 2022-01-01 RX ADMIN — SCOPALAMINE 1 PATCH: 1 PATCH, EXTENDED RELEASE TRANSDERMAL at 16:58

## 2022-01-01 RX ADMIN — SIMVASTATIN 20 MG: 10 TABLET, FILM COATED ORAL at 20:23

## 2022-01-01 RX ADMIN — OXYCODONE HYDROCHLORIDE 5 MG: 5 TABLET ORAL at 01:00

## 2022-01-01 RX ADMIN — SIMVASTATIN 20 MG: 10 TABLET, FILM COATED ORAL at 22:57

## 2022-01-01 RX ADMIN — LORAZEPAM 1 MG: 2 INJECTION INTRAMUSCULAR; INTRAVENOUS at 03:58

## 2022-01-01 RX ADMIN — MORPHINE SULFATE 10 MG: 10 INJECTION INTRAVENOUS at 16:25

## 2022-01-01 RX ADMIN — CYANOCOBALAMIN TAB 500 MCG 1000 MCG: 500 TAB at 05:42

## 2022-01-01 RX ADMIN — POTASSIUM CHLORIDE 40 MEQ: 20 TABLET, EXTENDED RELEASE ORAL at 06:08

## 2022-01-01 RX ADMIN — POTASSIUM PHOSPHATE, MONOBASIC AND POTASSIUM PHOSPHATE, DIBASIC 15 MMOL: 224; 236 INJECTION, SOLUTION, CONCENTRATE INTRAVENOUS at 10:23

## 2022-01-01 RX ADMIN — OXYCODONE 5 MG: 5 TABLET ORAL at 12:00

## 2022-01-01 RX ADMIN — ATROPINE SULFATE 2 DROP: 10 SOLUTION OPHTHALMIC at 09:29

## 2022-01-01 RX ADMIN — ASPRIN AND EXTENDED-RELEASE DIPYRIDAMOLE 1 CAPSULE: 25; 200 CAPSULE ORAL at 05:42

## 2022-01-01 RX ADMIN — Medication 1 LOZENGE: at 11:58

## 2022-01-01 RX ADMIN — OMEPRAZOLE 40 MG: KIT at 05:09

## 2022-01-01 RX ADMIN — CYANOCOBALAMIN TAB 500 MCG 1000 MCG: 500 TAB at 05:25

## 2022-01-01 RX ADMIN — TERAZOSIN HYDROCHLORIDE 10 MG: 5 CAPSULE ORAL at 22:56

## 2022-01-01 RX ADMIN — Medication 100 MG: at 12:11

## 2022-01-01 RX ADMIN — FOLIC ACID 1 MG: 1 TABLET ORAL at 05:09

## 2022-01-01 RX ADMIN — OXYCODONE 5 MG: 5 TABLET ORAL at 15:48

## 2022-01-01 RX ADMIN — CYANOCOBALAMIN TAB 500 MCG 1000 MCG: 500 TAB at 05:14

## 2022-01-01 RX ADMIN — PANTOPRAZOLE SODIUM 20 MG: 40 INJECTION, POWDER, FOR SOLUTION INTRAVENOUS at 04:23

## 2022-01-01 RX ADMIN — MORPHINE SULFATE 10 MG: 10 INJECTION INTRAVENOUS at 09:33

## 2022-01-01 RX ADMIN — SENNOSIDES AND DOCUSATE SODIUM 2 TABLET: 50; 8.6 TABLET ORAL at 18:00

## 2022-01-01 RX ADMIN — SODIUM CHLORIDE: 9 INJECTION, SOLUTION INTRAVENOUS at 21:06

## 2022-01-01 RX ADMIN — THIAMINE HCL TAB 100 MG 100 MG: 100 TAB at 05:10

## 2022-01-01 RX ADMIN — ASPRIN AND EXTENDED-RELEASE DIPYRIDAMOLE 1 CAPSULE: 25; 200 CAPSULE ORAL at 06:28

## 2022-01-01 RX ADMIN — MORPHINE SULFATE 5 MG: 10 INJECTION INTRAVENOUS at 03:58

## 2022-01-01 RX ADMIN — MORPHINE SULFATE 10 MG: 10 INJECTION INTRAVENOUS at 13:07

## 2022-01-01 RX ADMIN — OXYCODONE 5 MG: 5 TABLET ORAL at 21:45

## 2022-01-01 RX ADMIN — ASPRIN AND EXTENDED-RELEASE DIPYRIDAMOLE 1 CAPSULE: 25; 200 CAPSULE ORAL at 18:22

## 2022-01-01 RX ADMIN — FUROSEMIDE 40 MG: 10 INJECTION, SOLUTION INTRAMUSCULAR; INTRAVENOUS at 17:22

## 2022-01-01 RX ADMIN — OMEPRAZOLE 40 MG: KIT at 17:43

## 2022-01-01 RX ADMIN — FOLIC ACID 1 MG: 1 TABLET ORAL at 05:14

## 2022-01-01 RX ADMIN — CLOSTRIDIUM TETANI TOXOID ANTIGEN (FORMALDEHYDE INACTIVATED), CORYNEBACTERIUM DIPHTHERIAE TOXOID ANTIGEN (FORMALDEHYDE INACTIVATED), BORDETELLA PERTUSSIS TOXOID ANTIGEN (GLUTARALDEHYDE INACTIVATED), BORDETELLA PERTUSSIS FILAMENTOUS HEMAGGLUTININ ANTIGEN (FORMALDEHYDE INACTIVATED), BORDETELLA PERTUSSIS PERTACTIN ANTIGEN, AND BORDETELLA PERTUSSIS FIMBRIAE 2/3 ANTIGEN 0.5 ML: 5; 2; 2.5; 5; 3; 5 INJECTION, SUSPENSION INTRAMUSCULAR at 17:17

## 2022-01-01 RX ADMIN — DEXTROSE AND SODIUM CHLORIDE: 5; 450 INJECTION, SOLUTION INTRAVENOUS at 12:40

## 2022-01-01 RX ADMIN — POTASSIUM CHLORIDE 40 MEQ: 20 TABLET, EXTENDED RELEASE ORAL at 05:42

## 2022-01-01 RX ADMIN — ALBUMIN (HUMAN) 25 G: 12.5 SOLUTION INTRAVENOUS at 15:20

## 2022-01-01 RX ADMIN — SENNOSIDES AND DOCUSATE SODIUM 2 TABLET: 50; 8.6 TABLET ORAL at 05:14

## 2022-01-01 RX ADMIN — FUROSEMIDE 40 MG: 10 INJECTION, SOLUTION INTRAMUSCULAR; INTRAVENOUS at 05:14

## 2022-01-01 RX ADMIN — FUROSEMIDE 20 MG: 10 INJECTION, SOLUTION INTRAMUSCULAR; INTRAVENOUS at 06:05

## 2022-01-01 RX ADMIN — FOLIC ACID 1 MG: 1 TABLET ORAL at 05:43

## 2022-01-01 RX ADMIN — DEXTROSE AND SODIUM CHLORIDE: 5; 450 INJECTION, SOLUTION INTRAVENOUS at 05:33

## 2022-01-01 RX ADMIN — OXYCODONE HYDROCHLORIDE 5 MG: 5 TABLET ORAL at 05:09

## 2022-01-01 RX ADMIN — FOLIC ACID 1 MG: 1 TABLET ORAL at 05:42

## 2022-01-01 RX ADMIN — POTASSIUM PHOSPHATE, MONOBASIC AND POTASSIUM PHOSPHATE, DIBASIC 15 MMOL: 224; 236 INJECTION, SOLUTION, CONCENTRATE INTRAVENOUS at 06:51

## 2022-01-01 RX ADMIN — SENNOSIDES AND DOCUSATE SODIUM 2 TABLET: 50; 8.6 TABLET ORAL at 06:06

## 2022-01-01 RX ADMIN — LORAZEPAM 1 MG: 2 INJECTION INTRAMUSCULAR; INTRAVENOUS at 12:55

## 2022-01-01 RX ADMIN — DEXTROSE AND SODIUM CHLORIDE: 5; 450 INJECTION, SOLUTION INTRAVENOUS at 08:58

## 2022-01-01 RX ADMIN — SENNOSIDES AND DOCUSATE SODIUM 2 TABLET: 50; 8.6 TABLET ORAL at 05:42

## 2022-01-01 RX ADMIN — OXYCODONE 5 MG: 5 TABLET ORAL at 03:27

## 2022-01-01 RX ADMIN — SIMVASTATIN 20 MG: 10 TABLET, FILM COATED ORAL at 21:34

## 2022-01-01 RX ADMIN — PANTOPRAZOLE SODIUM 20 MG: 40 INJECTION, POWDER, FOR SOLUTION INTRAVENOUS at 17:17

## 2022-01-01 RX ADMIN — HYDROMORPHONE HYDROCHLORIDE 0.5 MG: 1 INJECTION, SOLUTION INTRAMUSCULAR; INTRAVENOUS; SUBCUTANEOUS at 07:37

## 2022-01-01 RX ADMIN — SIMVASTATIN 20 MG: 10 TABLET, FILM COATED ORAL at 23:55

## 2022-01-01 RX ADMIN — CYANOCOBALAMIN TAB 500 MCG 1000 MCG: 500 TAB at 06:05

## 2022-01-01 RX ADMIN — ACETAMINOPHEN 650 MG: 325 TABLET, FILM COATED ORAL at 20:48

## 2022-01-01 RX ADMIN — ATROPINE SULFATE 1 DROP: 10 SOLUTION OPHTHALMIC at 10:33

## 2022-01-01 RX ADMIN — MORPHINE SULFATE 5 MG: 10 INJECTION INTRAVENOUS at 15:40

## 2022-01-01 RX ADMIN — SPIRONOLACTONE 100 MG: 100 TABLET ORAL at 13:53

## 2022-01-01 RX ADMIN — SIMVASTATIN 20 MG: 10 TABLET, FILM COATED ORAL at 20:43

## 2022-01-01 RX ADMIN — PANTOPRAZOLE SODIUM 20 MG: 40 INJECTION, POWDER, FOR SOLUTION INTRAVENOUS at 05:14

## 2022-01-01 RX ADMIN — OXYCODONE 5 MG: 5 TABLET ORAL at 20:24

## 2022-01-01 RX ADMIN — ATROPINE SULFATE 2 DROP: 10 SOLUTION OPHTHALMIC at 12:57

## 2022-01-01 RX ADMIN — PANTOPRAZOLE SODIUM 20 MG: 40 INJECTION, POWDER, FOR SOLUTION INTRAVENOUS at 06:05

## 2022-01-01 RX ADMIN — HYDROMORPHONE HYDROCHLORIDE 0.5 MG: 1 INJECTION, SOLUTION INTRAMUSCULAR; INTRAVENOUS; SUBCUTANEOUS at 01:13

## 2022-01-01 RX ADMIN — ASPRIN AND EXTENDED-RELEASE DIPYRIDAMOLE 1 CAPSULE: 25; 200 CAPSULE ORAL at 05:15

## 2022-01-01 RX ADMIN — ASPRIN AND EXTENDED-RELEASE DIPYRIDAMOLE 1 CAPSULE: 25; 200 CAPSULE ORAL at 18:00

## 2022-01-01 RX ADMIN — OXYCODONE HYDROCHLORIDE 5 MG: 5 TABLET ORAL at 16:36

## 2022-01-01 RX ADMIN — FOLIC ACID 1 MG: 1 TABLET ORAL at 05:25

## 2022-01-01 RX ADMIN — OXYCODONE HYDROCHLORIDE 5 MG: 5 TABLET ORAL at 17:11

## 2022-01-01 RX ADMIN — MORPHINE SULFATE 10 MG: 10 INJECTION INTRAVENOUS at 16:59

## 2022-01-01 RX ADMIN — HYDROMORPHONE HYDROCHLORIDE 0.5 MG: 1 INJECTION, SOLUTION INTRAMUSCULAR; INTRAVENOUS; SUBCUTANEOUS at 21:03

## 2022-01-01 RX ADMIN — SIMVASTATIN 20 MG: 10 TABLET, FILM COATED ORAL at 00:06

## 2022-01-01 RX ADMIN — OMEPRAZOLE 40 MG: KIT at 17:11

## 2022-01-01 RX ADMIN — PANTOPRAZOLE SODIUM 20 MG: 40 INJECTION, POWDER, FOR SOLUTION INTRAVENOUS at 18:25

## 2022-01-01 RX ADMIN — IOHEXOL 100 ML: 350 INJECTION, SOLUTION INTRAVENOUS at 13:22

## 2022-01-01 RX ADMIN — SPIRONOLACTONE 100 MG: 100 TABLET ORAL at 05:14

## 2022-01-01 RX ADMIN — OXYCODONE HYDROCHLORIDE 5 MG: 5 TABLET ORAL at 09:02

## 2022-01-01 RX ADMIN — OXYCODONE 5 MG: 5 TABLET ORAL at 16:27

## 2022-01-01 RX ADMIN — Medication 1 LOZENGE: at 16:06

## 2022-01-01 RX ADMIN — OMEPRAZOLE 40 MG: KIT at 05:43

## 2022-01-01 RX ADMIN — HYDROMORPHONE HYDROCHLORIDE 0.5 MG: 1 INJECTION, SOLUTION INTRAMUSCULAR; INTRAVENOUS; SUBCUTANEOUS at 15:44

## 2022-01-01 RX ADMIN — HYDROMORPHONE HYDROCHLORIDE 0.5 MG: 1 INJECTION, SOLUTION INTRAMUSCULAR; INTRAVENOUS; SUBCUTANEOUS at 20:59

## 2022-01-01 RX ADMIN — PANTOPRAZOLE SODIUM 20 MG: 40 INJECTION, POWDER, FOR SOLUTION INTRAVENOUS at 05:25

## 2022-01-01 RX ADMIN — HYDROMORPHONE HYDROCHLORIDE 0.5 MG: 1 INJECTION, SOLUTION INTRAMUSCULAR; INTRAVENOUS; SUBCUTANEOUS at 02:09

## 2022-01-01 RX ADMIN — SENNOSIDES AND DOCUSATE SODIUM 2 TABLET: 50; 8.6 TABLET ORAL at 18:21

## 2022-01-01 RX ADMIN — SPIRONOLACTONE 50 MG: 50 TABLET ORAL at 06:05

## 2022-01-01 RX ADMIN — MORPHINE SULFATE: 10 INJECTION INTRAVENOUS at 22:48

## 2022-01-01 RX ADMIN — OXYCODONE 5 MG: 5 TABLET ORAL at 19:49

## 2022-01-01 RX ADMIN — CYANOCOBALAMIN TAB 500 MCG 1000 MCG: 500 TAB at 05:10

## 2022-01-01 RX ADMIN — MORPHINE SULFATE 10 MG: 10 INJECTION INTRAVENOUS at 15:00

## 2022-01-01 RX ADMIN — METOPROLOL TARTRATE 50 MG: 50 TABLET, FILM COATED ORAL at 22:57

## 2022-01-01 RX ADMIN — ACETAMINOPHEN 650 MG: 325 TABLET, FILM COATED ORAL at 11:39

## 2022-01-01 RX ADMIN — FOLIC ACID 1 MG: 1 TABLET ORAL at 06:06

## 2022-01-01 RX ADMIN — PANTOPRAZOLE SODIUM 20 MG: 40 INJECTION, POWDER, FOR SOLUTION INTRAVENOUS at 17:22

## 2022-01-01 RX ADMIN — OMEPRAZOLE 20 MG: 20 CAPSULE, DELAYED RELEASE ORAL at 05:42

## 2022-01-01 ASSESSMENT — PAIN DESCRIPTION - PAIN TYPE
TYPE: ACUTE PAIN
TYPE: ACUTE PAIN;SURGICAL PAIN
TYPE: ACUTE PAIN
TYPE: ACUTE PAIN;SURGICAL PAIN
TYPE: ACUTE PAIN
TYPE: ACUTE PAIN;SURGICAL PAIN
TYPE: ACUTE PAIN;CHRONIC PAIN
TYPE: ACUTE PAIN
TYPE: ACUTE PAIN;SURGICAL PAIN
TYPE: ACUTE PAIN

## 2022-01-01 ASSESSMENT — COGNITIVE AND FUNCTIONAL STATUS - GENERAL
MOBILITY SCORE: 14
MOBILITY SCORE: 7
PERSONAL GROOMING: A LITTLE
STANDING UP FROM CHAIR USING ARMS: A LITTLE
MOVING TO AND FROM BED TO CHAIR: UNABLE
SUGGESTED CMS G CODE MODIFIER MOBILITY: CM
SUGGESTED CMS G CODE MODIFIER MOBILITY: CL
PERSONAL GROOMING: A LOT
SUGGESTED CMS G CODE MODIFIER DAILY ACTIVITY: CK
DRESSING REGULAR LOWER BODY CLOTHING: A LOT
WALKING IN HOSPITAL ROOM: A LITTLE
CLIMB 3 TO 5 STEPS WITH RAILING: TOTAL
MOVING TO AND FROM BED TO CHAIR: UNABLE
TURNING FROM BACK TO SIDE WHILE IN FLAT BAD: A LOT
EATING MEALS: A LOT
DRESSING REGULAR UPPER BODY CLOTHING: A LOT
TOILETING: A LOT
TURNING FROM BACK TO SIDE WHILE IN FLAT BAD: UNABLE
EATING MEALS: A LITTLE
DAILY ACTIVITIY SCORE: 14
MOVING FROM LYING ON BACK TO SITTING ON SIDE OF FLAT BED: UNABLE
WALKING IN HOSPITAL ROOM: TOTAL
DRESSING REGULAR UPPER BODY CLOTHING: A LOT
MOVING FROM LYING ON BACK TO SITTING ON SIDE OF FLAT BED: A LITTLE
HELP NEEDED FOR BATHING: A LOT
CLIMB 3 TO 5 STEPS WITH RAILING: A LOT
SUGGESTED CMS G CODE MODIFIER DAILY ACTIVITY: CL
TOILETING: A LOT
STANDING UP FROM CHAIR USING ARMS: A LOT
HELP NEEDED FOR BATHING: A LOT
DAILY ACTIVITIY SCORE: 12
DRESSING REGULAR LOWER BODY CLOTHING: A LOT

## 2022-01-01 ASSESSMENT — GAIT ASSESSMENTS
ASSISTIVE DEVICE: FRONT WHEEL WALKER
DISTANCE (FEET): 2
DISTANCE (FEET): 8
ASSISTIVE DEVICE: FRONT WHEEL WALKER
GAIT LEVEL OF ASSIST: MINIMAL ASSIST
GAIT LEVEL OF ASSIST: MINIMAL ASSIST

## 2022-01-01 ASSESSMENT — LIFESTYLE VARIABLES
TOTAL SCORE: 1
CONSUMPTION TOTAL: NEGATIVE
TOTAL SCORE: 1
HAVE YOU EVER FELT YOU SHOULD CUT DOWN ON YOUR DRINKING: YES
ALCOHOL_USE: NO
TOTAL SCORE: 1
AVERAGE NUMBER OF DAYS PER WEEK YOU HAVE A DRINK CONTAINING ALCOHOL: 0
EVER FELT BAD OR GUILTY ABOUT YOUR DRINKING: NO
ON A TYPICAL DAY WHEN YOU DRINK ALCOHOL HOW MANY DRINKS DO YOU HAVE: 0
HAVE PEOPLE ANNOYED YOU BY CRITICIZING YOUR DRINKING: NO
HOW MANY TIMES IN THE PAST YEAR HAVE YOU HAD 5 OR MORE DRINKS IN A DAY: 0
DOES PATIENT WANT TO STOP DRINKING: NO
EVER HAD A DRINK FIRST THING IN THE MORNING TO STEADY YOUR NERVES TO GET RID OF A HANGOVER: NO

## 2022-01-01 ASSESSMENT — PATIENT HEALTH QUESTIONNAIRE - PHQ9
SUM OF ALL RESPONSES TO PHQ9 QUESTIONS 1 AND 2: 0
1. LITTLE INTEREST OR PLEASURE IN DOING THINGS: NOT AT ALL
2. FEELING DOWN, DEPRESSED, IRRITABLE, OR HOPELESS: NOT AT ALL

## 2022-01-01 ASSESSMENT — ACTIVITIES OF DAILY LIVING (ADL): TOILETING: INDEPENDENT

## 2022-01-01 ASSESSMENT — FIBROSIS 4 INDEX: FIB4 SCORE: 2.51

## 2022-03-03 NOTE — TELEPHONE ENCOUNTER
Received request via: Pharmacy    Was the patient seen in the last year in this department? Yes    Does the patient have an active prescription (recently filled or refills available) for medication(s) requested? No     Terazosin 10mg, simvastatin

## 2022-03-24 PROBLEM — R53.1 WEAKNESS: Status: ACTIVE | Noted: 2022-01-01

## 2022-03-24 NOTE — ED PROVIDER NOTES
ED Provider Note    Scribed for Donnell Arnold M.D. by Grey Johnson. 3/24/2022  4:09 PM    Primary care provider: Kris Horn M.D.  Means of arrival: EMS  History obtained from: Patient  History limited by: None    CHIEF COMPLAINT  Chief Complaint   Patient presents with   • T-5000 Head Injury       HPI  Kris Ambriz is a 76 y.o. male who presents to the Emergency Department via EMS as a code TBI onset prior to arrival. Per EMS the patient fell out of bed today and hit his head on the ground so EMS was called. He denies any loss of consciousness or neck pain. EMS notes that the patient has a swollen abdomen and several old injuries from previous falls. The patient denies drinking alcohol recently, however he notes that he used to drink. But he denies ever having a history drinking a large quantities of alcohol. He is not on blood thinners. He has not presented to a primary care doctor in several yeas and does not know when his last tetanus shot was. There are no known alleviating or exacerbating factors.     REVIEW OF SYSTEMS  Pertinent negatives include no loss of consciousness or neck pain. As above, all other systems reviewed and are negative.   See HPI for further details.     PAST MEDICAL HISTORY   has a past medical history of Hypertension.    SURGICAL HISTORY   has a past surgical history that includes inguinal hernia repair (7/18/2012); other orthopedic surgery; and hernia repair.    SOCIAL HISTORY  Social History     Tobacco Use   • Smoking status: Never Smoker   Substance Use Topics   • Alcohol use: Yes     Comment: daily shot and /or glass of wine   • Drug use: No      Social History     Substance and Sexual Activity   Drug Use No       FAMILY HISTORY  None reported.     CURRENT MEDICATIONS  Home Medications     Reviewed by Debbie Silva R.N. (Registered Nurse) on 03/24/22 at 1617  Med List Status: <None>   Medication Last Dose Status   dipyridamole-aspirin (AGGRENOX)  MG CP12  Active    hydrochlorothiazide (MICROZIDE) 12.5 MG capsule  Active   lisinopril (PRINIVIL) 5 MG TABS  Active   metoprolol (LOPRESSOR) 50 MG TABS  Active   omeprazole (PRILOSEC) 20 MG delayed-release capsule  Active   simvastatin (ZOCOR) 20 MG Tab  Active   terazosin (HYTRIN) 10 MG capsule  Active                ALLERGIES  Allergies   Allergen Reactions   • Nkda [No Known Drug Allergy]        PHYSICAL EXAM  VITAL SIGNS: /84   Pulse (!) 106   Resp 18   Ht 1.829 m (6')   Wt 88.5 kg (195 lb)   SpO2 95%   BMI 26.45 kg/m²   Constitutional: Thin body habitus, No acute distress, weak appearance.   HENT: Normocephalic, Atraumatic, Bilateral external ears normal, Oropharynx is clear mucous membranes are moist. No oral exudates or nasal discharge.   Eyes: Pupils are equal round and reactive, EOMI, Conjunctiva normal, No discharge.   Neck: Normal range of motion, No tenderness, Supple, No stridor. No meningismus.  Lymphatic: No lymphadenopathy noted.   Cardiovascular: Tachycardic rate and rhythm without murmur rub or gallop.  Thorax & Lungs: Clear breath sounds bilaterally without wheezes, rhonchi or rales. There is no chest wall tenderness.   Abdomen: Significant and firmly distended abdomen with ascites and caput medusa. There is no rebound or guarding. No organomegaly is appreciated. Bowel sounds are normal.  Skin: A number of recent abrasions and ecchymosis seen on the extremities bilaterally suggestive of frequent falls   Back: No CVA or spinal tenderness.   Extremities: Intact distal pulses, No edema, No tenderness, No cyanosis, No clubbing. Capillary refill is less than 2 seconds.  Musculoskeletal: Good range of motion in all major joints. No tenderness to palpation or major deformities noted.   Neurologic: Alert & oriented x 3, Normal motor function, Normal sensory function, No focal deficits noted. Reflexes are normal.  Psychiatric: Affect normal, Judgment normal, Mood normal. There is no suicidal ideation or  patient reported hallucinations.       DIAGNOSTIC STUDIES / PROCEDURES    LABS  Labs Reviewed   PROTHROMBIN TIME - Abnormal; Notable for the following components:       Result Value    PT 16.2 (*)     INR 1.34 (*)     All other components within normal limits    Narrative:     Indicate which anticoagulants the patient is on:->UNKNOWN   CBC WITH DIFFERENTIAL - Abnormal; Notable for the following components:    RBC 3.67 (*)     Hemoglobin 12.2 (*)     Hematocrit 36.9 (*)     .5 (*)     MCH 33.2 (*)     MCHC 33.1 (*)     RDW 50.8 (*)     MPV 8.8 (*)     Neutrophils-Polys 87.20 (*)     Lymphocytes 5.90 (*)     Neutrophils (Absolute) 8.99 (*)     Lymphs (Absolute) 0.61 (*)     All other components within normal limits    Narrative:     Indicate which anticoagulants the patient is on:->UNKNOWN   COMP METABOLIC PANEL - Abnormal; Notable for the following components:    Glucose 139 (*)     Alkaline Phosphatase 117 (*)     Total Bilirubin 1.7 (*)     Albumin 2.6 (*)     Globulin 4.7 (*)     All other components within normal limits    Narrative:     Indicate which anticoagulants the patient is on:->UNKNOWN   APTT    Narrative:     Indicate which anticoagulants the patient is on:->UNKNOWN   ESTIMATED GFR    Narrative:     Indicate which anticoagulants the patient is on:->UNKNOWN   AMMONIA      All labs reviewed by me.    RADIOLOGY  CT-HEAD W/O   Final Result      1. No CT evidence of acute infarct, hemorrhage or mass.   2. Moderate global parenchymal atrophy. Chronic small vessel ischemic changes.        The radiologist's interpretation of all radiological studies have been reviewed by me.    COURSE & MEDICAL DECISION MAKING  Nursing notes, VS, PMSFHx reviewed in chart.    4:09 PM Patient seen and examined at bedside. Ordered for CT-Head without and labs to evaluate. Patient was treated with tetanus-dipth-acell pertussis injection.    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  Alk phos is elevated at 117 a little low for suspected cancer but does not rule it out and he may need additional imaging.  Bilirubin is up at 1.7 but AST and ALT are normal.  Albumin is low at 2.6 increasing my suspicion that he has some liver disease and poor synthetic function and MCV V is elevated at 100.5.    6:37 PM Patient was reevaluated at bedside. The patient would like to be discharged at this time and his comfortable going home if he is able to ambulate with a walker. However, the patient's son feels he is too weak to be discharged at this time and would like him to remain in the hospital. I informed the patient and his son of my plan to consult with case management. Patient verbalizes understanding and agreement to this plan of care.     6:51 PM Case management is at bedside now.  We attempted to ambulate the patient and he failed.  He is too weak to walk and given his inability to walk and weakness we will admit him to the hospital for further work-up.  His grandson is really concerned based on the condition of his apartment that he may have some GI bleeding issues    I spoke with the [facility] practice resident on-call and he will admit the patient.  I spoke with him at approximately 7:45 PM    DISPOSITION:  Patient will be admitted in guarded condition to [facility] service for further work-up    FINAL IMPRESSION  1. Frequent falls    2. Unable to walk    3. Other ascites    4. Anemia, unspecified type    5. Weakness          Grey HARDY (Juan), am scribing for, and in the presence of, Donnell Arnold M.D..    Electronically signed by: Grey Johnson (Juan), 3/24/2022    Donnell HARDY M.D. personally performed the services described in this documentation, as scribed by Grey Johnson in my presence, and it is both accurate and complete.    The  note accurately reflects work and decisions made by me.  Donnell Arnold M.D.  3/24/2022  7:57 PM

## 2022-03-24 NOTE — ED TRIAGE NOTES
"Chief Complaint   Patient presents with   • T-5000 Head Injury     Pt BIB EMS after falling out of bed and striking his head. Pt denies LOC. Pt has abrasions and skin tears throughout. Pt unsure of last tetanus shot. Pt has abdominal swelling, and reports not seeing a doctor for \"many years\". Pt denies taking blood thinners.   "

## 2022-03-25 NOTE — THERAPY
"Occupational Therapy   Initial Evaluation     Patient Name: Kris Ambriz  Age:  76 y.o., Sex:  male  Medical Record #: [id number]  Today's Date: 3/25/2022     Precautions: Fall Risk,Other (See Comments)  Comments: muliple skin abraisons    Assessment  Patient is 76 y.o. male admitted for weakness. PMhx: hypertension, dyslipidemia, GERD and self report of previous alcohol abuse. This admission pt is dx w/peripheral edema, abdominal ascites, protein calorie malnutrition, HTN, tachycardia, and macrocytic anemia. Pt is presenting w/significant weakness and limited endurance, pt had multiple wounds and abrasions and appears to have limited ability to care for himself. At this time recommend post acute placement prior to d/c home     Plan  Recommend Occupational Therapy 3 times per week until therapy goals are met for the following treatments:  Self Care/Activities of Daily Living, Therapeutic Activities and Therapeutic Exercises.    DC Equipment Recommendations: Unable to determine at this time  Discharge Recommendations: Recommend post-acute placement for additional occupational therapy services prior to discharge home     Subjective    \"I'm not going home today?\"     Objective     03/25/22 0919   Total Time Spent   Total Time Spent (Mins) 22   Charge Group   OT Evaluation OT Evaluation Mod   Initial Contact Note    Initial Contact Note Order Received and Verified, Occupational Therapy Evaluation in Progress with Full Report to Follow.   Prior Living Situation   Prior Services None   Housing / Facility 2 Story House   Steps Into Home 1   Steps In Home   (flight pt reports he does not access the 2nd story)   Equipment Owned None   Lives with - Patient's Self Care Capacity Alone and Unable to Care For Self   Comments Pt reports family checks in but did not specifiy who and how often; pt has had several falls and has multple abraisons over his body   Prior Level of ADL Function   Self Feeding Independent   Grooming / " Hygiene Independent   Bathing Independent   Dressing Independent   Toileting Independent   Comments per pt report but appears to be declinging   Prior Level of IADL Function   Medication Management Independent   Laundry Requires Assist   Kitchen Mobility Requires Assist   Finances Requires Assist   Home Management Requires Assist   Shopping Requires Assist   Prior Level Of Mobility Independent Without Device in Community   Driving / Transportation Driving Independent   Comments per pt report   History of Falls   History of Falls Yes   Date of Last Fall   (reason for admisson and self reports frequent falls)   Precautions   Precautions Fall Risk;Other (See Comments)   Comments muliple skin abraisons   Pain 0 - 10 Group   Location Abdomen   Therapist Pain Assessment During Activity;Nurse Notified  (not rated)   Cognition    Cognition / Consciousness X   Level of Consciousness Alert   Safety Awareness Impaired   New Learning Impaired   Comments poor insight into current deficits and ability to care for himself at home   Passive ROM Upper Body   Passive ROM Upper Body WDL   Active ROM Upper Body   Active ROM Upper Body  X   Dominant Hand Right   Comments limited by weakness   Strength Upper Body   Upper Body Strength  X   Gross Strength Generalized Weakness, Equal Bilaterally.    Sensation Upper Body   Upper Extremity Sensation  Not Tested   Upper Body Muscle Tone   Upper Body Muscle Tone  WDL   Neurological Concerns   Neurological Concerns No   Coordination Upper Body   Coordination WDL   Balance Assessment   Sitting Balance (Static) Fair   Sitting Balance (Dynamic) Fair   Standing Balance (Static) Fair -   Standing Balance (Dynamic) Fair -   Weight Shift Sitting Fair   Weight Shift Standing Poor   Comments w/fww   Bed Mobility    Supine to Sit Moderate Assist   Sit to Supine Moderate Assist   Scooting Moderate Assist   Rolling Moderate Assist to Rt.;Moderate Assist to Lt.   ADL Assessment   Grooming Minimal  Assist;Standing   Upper Body Dressing Minimal Assist   Lower Body Dressing Maximal Assist   Toileting Maximal Assist   Comments significant BLE edema   How much help from another person does the patient currently need...   6 Clicks Daily Activity Score 14   Functional Mobility   Sit to Stand Minimal Assist   Bed, Chair, Wheelchair Transfer Minimal Assist   Mobility Eob>sit>stand x2 steps to sink then BTB   Edema / Skin Assessment   Edema / Skin  X   Right Lower Extremity Edema 2+ Pitting   Left Lower Extremity Edema 2+ Pitting   Skin Assessment Wound Risk High   Comments multiple abraisons cuts and bruises   Activity Tolerance   Comments c/o dizziness and fatigue   Patient / Family Goals   Patient / Family Goal #1 to go home   Short Term Goals   Short Term Goal # 1 pt will complete grooming standing at sink w/spv   Short Term Goal # 2 pt will complete toilet txf w/spv   Short Term Goal # 3 pt will complete LB dressing w/spv   Education Group   Role of Occupational Therapist Patient Response Patient;Acceptance   Problem List   Problem List Decreased Active Daily Living Skills;Decreased Upper Extremity Strength Right;Decreased Upper Extremity Strength Left;Decreased Functional Mobility;Decreased Activity Tolerance;Safety Awareness Deficits / Cognition;Impaired Postural Control / Balance

## 2022-03-25 NOTE — CARE PLAN
The patient is Watcher - Medium risk of patient condition declining or worsening    Shift Goals  Clinical Goals: safety, rest, imaging pending  Patient Goals: rest    Progress made toward(s) clinical / shift goals:    Problem: Knowledge Deficit - Standard  Goal: Patient and family/care givers will demonstrate understanding of plan of care, disease process/condition, diagnostic tests and medications  Outcome: Progressing     Problem: Skin Integrity  Goal: Skin integrity is maintained or improved  Outcome: Progressing     Problem: Fall Risk  Goal: Patient will remain free from falls  Outcome: Progressing       Patient is not progressing towards the following goals:

## 2022-03-25 NOTE — PROGRESS NOTES
[facility] MEDICINE PROGRESS NOTE     Attending: Berlin Lan MD    Resident: Cayetano Manuel MD    PATIENT: Kris Ambriz; [id number]; 1945    ID: Kris Ambriz is a 76 y.o. male with hypertension, dyslipidemia, and GERD who presents after a ground-level fall at home, now with concern for cirrhosis of the liver.    SUBJECTIVE: No acute events overnight, pt reports he is not in pain.    OBJECTIVE:  Temp:  [36.9 °C (98.5 °F)-37.5 °C (99.5 °F)] 37.5 °C (99.5 °F)  Pulse:  [] 84  Resp:  [13-24] 16  BP: ()/(52-84) 92/59  SpO2:  [88 %-96 %] 93 %    No intake or output data in the 24 hours ending 03/25/22 0732    PE:  General: No acute distress, resting comfortably in bed. Extremely cachectic  HEENT: NC/AT. EOMI. MMM  Cardiovascular: RRR, no m/r/g, normal S1/S2  Respiratory: Symmetric inspiratory effort. CTAB with no adventitious sounds  Abdomen: BS+, soft, non-tender, distended with ascites   EXT:  MAY, 2+ radial pulses, 2+ pitting edema LE bilaterally  Neuro: Non focal, alert and oriented x 4, no confusion    LABS:  Recent Labs     03/24/22  1640   WBC 10.3   RBC 3.67*   HEMOGLOBIN 12.2*   HEMATOCRIT 36.9*   .5*   MCH 33.2*   RDW 50.8*   PLATELETCT 275   MPV 8.8*   NEUTSPOLYS 87.20*   LYMPHOCYTES 5.90*   MONOCYTES 6.30   EOSINOPHILS 0.00   BASOPHILS 0.20     Recent Labs     03/24/22  1640 03/25/22  0250   SODIUM 135 136   POTASSIUM 4.0 4.1   CHLORIDE 98 101   CO2 23 27   BUN 14 21   CREATININE 0.83 0.71   CALCIUM 8.9 8.0*   ALBUMIN 2.6*  --      Estimated GFR/CRCL = Estimated Creatinine Clearance: 95.6 mL/min (by C-G formula based on SCr of 0.71 mg/dL).  Recent Labs     03/24/22  1640 03/25/22  0250   GLUCOSE 139* 126*     Recent Labs     03/24/22  1640 03/25/22  0250   ASTSGOT 37  --    ALTSGPT 17  --    TBILIRUBIN 1.7*  --    ALKPHOSPHAT 117*  --    GLOBULIN 4.7*  --    INR 1.34*  --    AMMONIA  --  35             Recent Labs     03/24/22  1640   INR 1.34*   APTT 34.0        MICROBIOLOGY:  Results     ** No results found for the last 168 hours. **          IMAGING:  DX-CHEST-LIMITED (1 VIEW)   Final Result         Soft tissue prominence seen along the right heart border could relate to dilated cardiac chamber. A mass cannot be excluded. Further evaluation with CT recommended.      CT-HEAD W/O   Final Result      1. No CT evidence of acute infarct, hemorrhage or mass.   2. Moderate global parenchymal atrophy. Chronic small vessel ischemic changes.      US-ABDOMEN COMPLETE SURVEY    (Results Pending)   EC-ECHOCARDIOGRAM COMPLETE W/O CONT    (Results Pending)       MEDS:  Current Facility-Administered Medications   Medication Last Admin   • cyanocobalamin (VITAMIN B-12) tablet 1,000 mcg 1,000 mcg at 03/25/22 0542   • folic acid (FOLVITE) tablet 1 mg 1 mg at 03/25/22 0542   • aspirin-dipyridamole (AGGRENOX)  MG 1 Capsule 1 Capsule at 03/25/22 0542   • hydroCHLOROthiazide (HYDRODIURIL) tablet 12.5 mg     • lisinopril (PRINIVIL) tablet 5 mg     • omeprazole (PRILOSEC) capsule 20 mg 20 mg at 03/25/22 0542   • simvastatin (ZOCOR) tablet 20 mg 20 mg at 03/24/22 2257   • terazosin (HYTRIN) capsule 10 mg 10 mg at 03/24/22 2256   • senna-docusate (PERICOLACE or SENOKOT S) 8.6-50 MG per tablet 2 Tablet 2 Tablet at 03/25/22 0542    And   • polyethylene glycol/lytes (MIRALAX) PACKET 1 Packet      And   • magnesium hydroxide (MILK OF MAGNESIA) suspension 30 mL      And   • bisacodyl (DULCOLAX) suppository 10 mg     • acetaminophen (Tylenol) tablet 650 mg     • labetalol (NORMODYNE/TRANDATE) injection 10 mg     • metoprolol tartrate (LOPRESSOR) tablet 50 mg 50 mg at 03/24/22 2257       PROBLEM LIST:  No problems updated.    ASSESSMENT/PLAN: 76 y.o. male admitted for      #Weakness  #Peripheral edema  #Abdominal ascites  #Protein calorie malnutrition  76-year-old male who does report nightly drinking which she stopped 3 years ago who presents with ground-level fall and is found to be too weak  to ambulate. On exam he has abdominal ascites with 2+ pitting edema in the lower extremities bilaterally.  He is cachectic.  Labs reveal slightly elevated INR, normal ammonia, with elevated BNP, echo normal.   - US abdomen positive for cirrhotic liver and ascites.  - Spironolactone + lasix   - titrate up spironolactone to 400  -PT/OT  - paracentesis tomorrow     #HTN  Continue home meds:  - Metoprolol 50 mg p.o. daily  - DC HCTZ and lisinopril, pt has soft pressures and we are starting lasix + spironolactone     #Tachycardia, resolved  Patient reports he has not taken his home meds in 3 days.  Tachycardia could likely be attributed to metoprolol withdrawal  - Recovered after resuming home metoprolol     #Macrocytic anemia  Patient with macrocytic anemia likely secondary to dietary deficiencies versus malabsorption versus chronic blood loss. Niece repots possible bloody stool at home. Pt reports black stools 3 days ago  -Supplement B12 and folate  - Check FOB  - Repeat CBC in the AM  - protonix 20mg BID     #GERD  - protonix as above     #Social  Patient lives alone.  Discussed possible need for rehab and the patient is agreeable to this.    #Goals of Care  Discussed with patient and family, patient has capacity to make medical decisions at this time.  - Pt desires DNR/DNI, code status updated     Core Measures:  Lines: Peripheral IV  Tubes: None  Diet: Regular  Abx: Not indicated  DVT Ppx: SCDs  GI Ppx: protonix BID  PCP: Kris Horn M.D.   CODE STATUS: DNR/DI     Dispo: Inpatient for further work-up and possible placement    Cayetano Manuel MD  PGY1  [facility]

## 2022-03-25 NOTE — DISCHARGE PLANNING
SW met with Pt and his Son bedside to provide resources.  Pt's Son plans on going to Pt's home tomorrow to start cleaning up the mess.  Pt agreeable to getting care when he discharges if he is still having difficulties caring for himself.  SW discussed HH, PCA, and Assisted living with Pt.  Pt agreeable to having people come in his home if needed.

## 2022-03-25 NOTE — PROGRESS NOTES
Assessment complete. Denies pain, denies SOB. +3 BLE edema noted. Sacral redness, blanching, barrier paste applied. Scattered abrasions and bruising noted. Abdominal edema noted. Orders reviewed. Reviewed POC with pt, pt verbalized understanding. Explained to pt importance of calling before getting out of bed, pt verbalized understanding. Pt oriented to call light system. Bed locked and in lowest position, bed alarm on. Will continue to monitor.

## 2022-03-25 NOTE — H&P
[facility] HISTORY AND PHYSICAL     PATIENT ID:  NAME:  Kris Ambriz  MRN:               [id number]  YOB: 1945    Date of Admission:   3/24/2022     Attending:   Berlin Lan M.D.    Resident:   Chong Dunbar M.D.    Primary Care Physician:    Kris Horn M.D.     CC:    Weakness      HPI:   Kris Ambriz is a 76 y.o. male with hypertension, dyslipidemia, and GERD who presents after a ground-level fall at home.  Patient reports he was in his usual state of health until about a month ago when he had a diarrheal illness that he was treated for and resolved.  He reports he lost significant amount of weight at that time, about 30 pounds.  He has been doing okay at home until today when he fell by his bed and hit his head.  He was brought to [facility] for evaluation.    In the emergency department the patient's vitals are significant for pulse 106 bpm.  Labs are significant for mild macrocytic anemia and low albumin.  CT of the head was obtained and showed no acute findings or bleed.  Patient was road tested and unable ambulates [facility] family medicine was paged for further evaluation and management.    Currently the patient is resting comfortably.  He reports he is usually able to ambulate without difficulty.  Reports he had trouble getting up from the floor which is normal for him.  He otherwise feels well denies any focal weakness, numbness, cough, shortness of breath, increased swelling in extremities, or other complaints.    His niece page me through my answering service and reported that the patient may have had bright red blood per rectum.  Patient denies any history of this.      REVIEW OF SYSTEMS:   Ten systems reviewed and were negative except as noted in the HPI.                PAST MEDICAL HISTORY:  Past Medical History:   Diagnosis Date   • Hypertension        PAST SURGICAL HISTORY:  Past Surgical History:   Procedure Laterality Date   • INGUINAL HERNIA  REPAIR  2012    Performed by BART FRAZIER at SURGERY [facility] ORS   • HERNIA REPAIR     • OTHER ORTHOPEDIC SURGERY         FAMILY HISTORY:  Father  of a stroke.  Mother  at 96 of unknown reason    SOCIAL HISTORY:   Reports he used to be a nightly drinker and stopped 3 years ago.  Denies tobacco alcohol or drug use.    DIET:   Orders Placed This Encounter   Procedures   • Diet Order Diet: Regular; Nutrient modifications: (optional): High Protein     Standing Status:   Standing     Number of Occurrences:   1     Order Specific Question:   Diet:     Answer:   Regular [1]     Order Specific Question:   Nutrient modifications: (optional)     Answer:   High Protein [4]       ALLERGIES:  Allergies   Allergen Reactions   • Nkda [No Known Drug Allergy]        OUTPATIENT MEDICATIONS:    Current Facility-Administered Medications:   •  [START ON 3/25/2022] aspirin-dipyridamole (AGGRENOX)  MG 1 Capsule, 1 Capsule, Oral, BID, Chong Dunbar M.D.  •  [START ON 3/25/2022] hydroCHLOROthiazide (HYDRODIURIL) tablet 12.5 mg, 12.5 mg, Oral, DAILY, Chong Dunbar M.D.  •  [START ON 3/25/2022] lisinopril (PRINIVIL) tablet 5 mg, 5 mg, Oral, DAILY, Chong Dunbar M.D.  •  [START ON 3/25/2022] metoprolol tartrate (LOPRESSOR) tablet 50 mg, 50 mg, Oral, DAILY, Chong Dunbar M.D.  •  [START ON 3/25/2022] omeprazole (PRILOSEC) capsule 20 mg, 20 mg, Oral, DAILY, Chong Dunbar M.D.  •  simvastatin (ZOCOR) tablet 20 mg, 20 mg, Oral, Nightly, Chong Dunbar M.D.  •  terazosin (HYTRIN) capsule 10 mg, 10 mg, Oral, Nightly, Chong Dunbar M.D.  •  senna-docusate (PERICOLACE or SENOKOT S) 8.6-50 MG per tablet 2 Tablet, 2 Tablet, Oral, BID **AND** polyethylene glycol/lytes (MIRALAX) PACKET 1 Packet, 1 Packet, Oral, QDAY PRN **AND** magnesium hydroxide (MILK OF MAGNESIA) suspension 30 mL, 30 mL, Oral, QDAY PRN **AND** bisacodyl (DULCOLAX) suppository 10 mg, 10 mg, Rectal, QDAY PRN, Chong Dunbar M.D.  •  acetaminophen  (Tylenol) tablet 650 mg, 650 mg, Oral, Q6HRS PRN, Chong Dunbar M.D.  •  labetalol (NORMODYNE/TRANDATE) injection 10 mg, 10 mg, Intravenous, Q4HRS PRN, Chong Dunbar M.D.    Current Outpatient Medications:   •  omeprazole (PRILOSEC) 20 MG delayed-release capsule, Take 1 Capsule by mouth every day., Disp: 90 Capsule, Rfl: 3  •  hydrochlorothiazide (MICROZIDE) 12.5 MG capsule, Take 2 Capsules by mouth every morning., Disp: 30 Capsule, Rfl: 0  •  terazosin (HYTRIN) 10 MG capsule, Take 1 Capsule by mouth every evening., Disp: 90 Capsule, Rfl: 3  •  simvastatin (ZOCOR) 20 MG Tab, Take 1 Tablet by mouth every evening., Disp: 90 Tablet, Rfl: 3  •  lisinopril (PRINIVIL) 5 MG TABS, Take 1 Tab by mouth every day., Disp: 30 Tab, Rfl: 0  •  dipyridamole-aspirin (AGGRENOX)  MG CP12, Take 1 Cap by mouth 2 times a day., Disp: 60 Cap, Rfl: 0  •  metoprolol (LOPRESSOR) 50 MG TABS, Take 50 mg by mouth every day. Indications: **tartrate QD per pharmacy**, Disp: , Rfl:     PHYSICAL EXAM:  Vitals:    03/24/22 1800 03/24/22 1900 03/24/22 2000 03/24/22 2100   BP: 115/71 126/77 127/72 128/78   Pulse: 97 (!) 102 99 (!) 101   Resp: 20 20 (!) 21 20   SpO2: 96% 96% 94% 94%   Weight:       Height:       , No data recorded.  , Pulse Oximetry: 94 %, O2 (LPM): 0, O2 Delivery Device: None - Room Air    General: Pt resting in NAD, cachectic  Skin:  Pink, warm and dry.  No rashes  HEENT: NC/AT. EOMI. MMM. No nasal discharge. Oropharynx nonerythematous without exudate/plaques  Neck:  Supple without lymphadenopathy or rigidity. No JVD   Lungs:  Symmetrical.  CTAB with no W/R/R.  Good air movement   Cardiovascular:  S1/S2 RRR without M/R/G.  Abdomen: Distended, bowel sounds present, nontender, no rebound or guarding.  Genitourinary: Deferred  Extremities: 2+ pitting edema in the bilateral lower extremities  CNS: Nonfocal      LAB TESTS:   Recent Labs     03/24/22  1640   WBC 10.3   RBC 3.67*   HEMOGLOBIN 12.2*   HEMATOCRIT 36.9*   .5*    MCH 33.2*   RDW 50.8*   PLATELETCT 275   MPV 8.8*   NEUTSPOLYS 87.20*   LYMPHOCYTES 5.90*   MONOCYTES 6.30   EOSINOPHILS 0.00   BASOPHILS 0.20         Recent Labs     03/24/22  1640   SODIUM 135   POTASSIUM 4.0   CHLORIDE 98   CO2 23   BUN 14   CREATININE 0.83   CALCIUM 8.9   ALBUMIN 2.6*       CULTURES:   Results     ** No results found for the last 168 hours. **          IMAGES:  CT-HEAD W/O   Final Result      1. No CT evidence of acute infarct, hemorrhage or mass.   2. Moderate global parenchymal atrophy. Chronic small vessel ischemic changes.      US-ABDOMEN COMPLETE SURVEY    (Results Pending)   EC-ECHOCARDIOGRAM COMPLETE W/O CONT    (Results Pending)       CONSULTS:   None    ASSESSMENT/PLAN:   76 y.o. male admitted for     #Weakness  #Peripheral edema  #Abdominal ascites  #Protein calorie malnutrition  76-year-old male who does report nightly drinking which she stopped 3 years ago who presents with ground-level fall and is found to be too weak to ambulate.  His history is essentially noncontributory.  On exam he has abdominal distention and likely ascites with 2+ pitting edema in the lower extremities bilaterally.  He is cachectic.  Labs reveal slightly elevated INR.  Differential diagnosis at this time includes, but is not limited to cirrhosis versus CHF versus hypovolemia.    Plan:  -PT OT  -Check echocardiogram  -Check abdominal ultrasound to evaluate for ascites and/or cirrhosis  - Check BNP  - Will hold off on diuresis at this point until studies are back  - Tacycardia likely 2/2 to metoprolol withdraw.    #HTN  Continue home meds:  Metoprolol 50 mg p.o. daily  Hydrochlorothiazide 12.5 mg p.o. twice daily  Lisinopril 5 mg p.o. daily    #Tachycardia  Patient reports he has not taken his home meds in 3 days.  Tachycardia could likely be attributed to metoprolol withdrawal versus hypovolemia    Plan:  -Resume home metoprolol and monitor response    #Macrocytic anemia  Patient with macrocytic anemia  likely secondary to dietary deficiencies versus malabsorption versus chronic blood loss. Niece repots possible bloody stool at home.     Plan:  -Supplement B12 and folate  -Follow-up as an outpatient  - Check FOB  - Repeat CBC in the AM    #GERD  - Continue omeprazole     #Social  Patient lives alone.  Discussed possible need for rehab and the patient is agreeable to this.    Core Measures:  Lines: Peripheral IV  Tubes: None  Diet: Regular  Abx: Not indicated  DVT Ppx: SCDs  GI Ppx: Not indicated  PCP: Kris Horn M.D.   CODE STATUS: DNR, intubation okay    Dispo: Inpatient for further work-up and possible placement    Chong Dunbar M.D. (PGY-3)

## 2022-03-25 NOTE — PROGRESS NOTES
4 Eyes Skin Assessment Completed by SAMMY Lewis and Mojgan RN.    Head Redness  Ears WDL  Nose WDL  Mouth WDL  Neck WDL  Breast/Chest Redness, Abrasion and Bruising  Shoulder Blades Redness and Blanching  Spine Redness and Blanching  (R) Arm/Elbow/Hand Redness, Blanching, Bruising, Abrasion, Scab and Scar  (L) Arm/Elbow/Hand Redness, Blanching, Bruising, Abrasion, Scab and Scar  Abdomen edema  Groin WDL  Scrotum/Coccyx/Buttocks Redness and Blanching  (R) Leg Redness, Blanching, Abrasion and Edema  (L) Leg Redness, Blanching, Abrasion and Edema  (R) Heel/Foot/Toe Edema  (L) Heel/Foot/Toe Edema          Devices In Places Pulse Ox, PIV      Interventions In Place Pillows, Barrier Cream and Pressure Redistribution Mattress, q2 turns    Possible Skin Injury Yes    Pictures Uploaded Into Epic No, needs to be completed  Wound Consult Placed Yes  RN Wound Prevention Protocol Ordered Yes

## 2022-03-25 NOTE — THERAPY
Physical Therapy   Initial Evaluation     Patient Name: Kris Ambriz  Age:  76 y.o., Sex:  male  Medical Record #: [id number]  Today's Date: 3/25/2022     Precautions  Precautions: Fall Risk;Other (See Comments)  Comments: muliple skin abraisons    Assessment  Patient is 76 y.o. male with a diagnosis of GLF, 30 lb wt loss in last few months.  Additional factors influencing patient status / progress : pt with fragile skin, multiple abrasions, pt reports being down a long time after falling out of bed. Today, pt needed mod assist out of bed, min A transfers and short walk to sink using FWW, too tired for more, poor endurance. PT to follow.      Plan    Recommend Physical Therapy 3 times per week until therapy goals are met for the following treatments:  Bed Mobility, Gait Training, Neuro Re-Education / Balance, Stair Training, and Therapeutic Activities    DC Equipment Recommendations: Unable to determine at this time  Discharge Recommendations: Recommend post-acute placement for additional physical therapy services prior to discharge home        Objective       03/25/22 0917   Total Time Spent   Total Time Spent (Mins) 32   Charge Group   PT Evaluation PT Evaluation Mod   Initial Contact Note    Initial Contact Note Order Received and Verified, Physical Therapy Evaluation in Progress with Full Report to Follow.   Precautions   Precautions Fall Risk   Comments fragile skin, multiple abrasions, pt reports that he was down a long time with this fall.   Vitals   O2 Delivery Device None - Room Air   Pain 0 - 10 Group   Therapist Pain Assessment During Activity;Nurse Notified   Prior Living Situation   Prior Services None   Housing / Facility 2 Story House   Steps Into Home 1   Steps In Home 0  (pt lives first floor only)   Equipment Owned   (mechanical HOB at home)   Lives with - Patient's Self Care Capacity Alone and Unable to Care For Self   Comments pt mentions son who suggested he get a life alert button to wear, does  "not answer question about how often he sees family.   Prior Level of Functional Mobility   Bed Mobility Independent   Transfer Status Independent   Ambulation Independent   Distance Ambulation (Feet)   (household, though mentions getting food via \"drive through\", not clear how recently pt was driving.)   Assistive Devices Used None  (reports using long handle grabber to \"steady\" myself, unsafe.)   Stairs Independent   History of Falls   History of Falls Yes   Date of Last Fall   (cause of admit and other falls as well.)   Cognition    Cognition / Consciousness X   Level of Consciousness Alert   Safety Awareness Impaired   New Learning Impaired   Comments pt reports cause of this fall was rolling  out of bed.   Active ROM Lower Body    Active ROM Lower Body  WDL   Strength Lower Body   Lower Body Strength  X   Comments poor endurance.   Balance Assessment   Sitting Balance (Static) Fair   Sitting Balance (Dynamic) Fair   Standing Balance (Static) Fair -   Standing Balance (Dynamic) Fair -   Weight Shift Sitting Fair   Weight Shift Standing Poor   Comments with FWW   Gait Analysis   Gait Level Of Assist Minimal Assist   Assistive Device Front Wheel Walker   Distance (Feet) 8   # of Times Distance was Traveled 1   Deviation   (poor endurance.)   # of Stairs Climbed 0   Weight Bearing Status no restrictions   Bed Mobility    Supine to Sit Moderate Assist   Sit to Supine Moderate Assist   Scooting Moderate Assist   Rolling Moderate Assist to Rt.;Moderate Assist to Lt.   Functional Mobility   Sit to Stand Minimal Assist   Bed, Chair, Wheelchair Transfer Minimal Assist   How much difficulty does the patient currently have...   Turning over in bed (including adjusting bedclothes, sheets and blankets)? 2   Sitting down on and standing up from a chair with arms (e.g., wheelchair, bedside commode, etc.) 3   Moving from lying on back to sitting on the side of the bed? 1   How much help from another person does the patient " currently need...   Moving to and from a bed to a chair (including a wheelchair)? 3   Need to walk in a hospital room? 3   Climbing 3-5 steps with a railing? 2   6 clicks Mobility Score 14   Activity Tolerance   Standing 5   Edema / Skin Assessment   Edema / Skin  X   Comments abrasions, fragile skin   Short Term Goals    Short Term Goal # 1 Pt will perform bed mobility with HOB as needed with supervision in 6 visits   Short Term Goal # 2 Pt will transfer with supervision in 6 visits to improve functional indep.   Short Term Goal # 3 Pt will ambulate x 125 feet using FWW with supervision in 6 visits to improve functional indep.   Short Term Goal # 4 Pt will negotiate one step with supervision in 6 visits to access home.   Education Group   Education Provided Role of Physical Therapist   Role of Physical Therapist Patient Response Patient;Acceptance;Explanation;Verbal Demonstration   Problem List    Problems Impaired Bed Mobility;Impaired Transfers;Impaired Ambulation;Impaired Balance;Decreased Activity Tolerance;Safety Awareness Deficits / Cognition   Anticipated Discharge Equipment and Recommendations   DC Equipment Recommendations Unable to determine at this time   Discharge Recommendations Recommend post-acute placement for additional physical therapy services prior to discharge home   Interdisciplinary Plan of Care Collaboration   IDT Collaboration with  Nursing   Patient Position at End of Therapy In Bed;Bed Alarm On;Call Light within Reach;Tray Table within Reach;Phone within Reach   Collaboration Comments frederick updated   Session Information   Date / Session Number  3/25-1 (1/3, 3/31)

## 2022-03-25 NOTE — ED NOTES
Patient failed walk test. Patient is unable to even get self to side of bed without maximum assistance.

## 2022-03-25 NOTE — ED NOTES
Unable to obtain medrec. Patient unsure of what medications he is on. Patient states has not taken any medications in approximately 1 week.     Allergies confirmed and updated.

## 2022-03-26 PROBLEM — K74.60 CIRRHOSIS (HCC): Status: ACTIVE | Noted: 2022-01-01

## 2022-03-26 NOTE — PROGRESS NOTES
Bedside shift report received from night shift RN. Patient is A+Ox4 and is resting comfortably. Patient states 3/10 pain and denies need for pain medication at this time. Patient updated on current plan of care and shift goals established. No further needs at this time. Bed locked in lowest position, upper bed rails up, call light and belongings within reach. Hourly rounding in place.

## 2022-03-26 NOTE — PROGRESS NOTES
Critical lab notification: Hgb drop from 12.2 on 3/24 to 7.9 this morning. Cayetano Manuel notified.

## 2022-03-26 NOTE — CARE PLAN
Problem: Nutritional:  Goal: Achieve adequate nutritional intake  Description: Patient will consume >50% of meals and supplements   Outcome: Progressing     See RD note

## 2022-03-26 NOTE — CARE PLAN
The patient is Watcher - Medium risk of patient condition declining or worsening    Shift Goals  Clinical Goals: Hemodynamic stability, saftey, rest, pain control  Patient Goals: Pain control, rest  Family Goals: No family present at this time    Problem: Knowledge Deficit - Standard  Goal: Patient and family/care givers will demonstrate understanding of plan of care, disease process/condition, diagnostic tests and medications  3/26/2022 0831 by Cassi Barnes R.N.  Outcome: Progressing     Problem: Skin Integrity  Goal: Skin integrity is maintained or improved  3/26/2022 0831 by Cassi Barnes R.N.  Outcome: Progressing     Problem: Fall Risk  Goal: Patient will remain free from falls  3/26/2022 0831 by Cassi Barnes R.N.  Outcome: Progressing     Problem: Pain - Standard  Goal: Alleviation of pain or a reduction in pain to the patient’s comfort goal  3/26/2022 0831 by aCssi Barnes, R.N.  Outcome: Progressing     Progress made toward(s) clinical / shift goals:      Patient updated on current plan of care and verbalizes understanding.   Pain is controlled with current medications per MAR.   Fall education provided and patient verbalizes understanding.   Patient is on a waffle mattress and Q2 turns in place.    Patient is not progressing towards the following goals:

## 2022-03-26 NOTE — PROGRESS NOTES
[facility] MEDICINE PROGRESS NOTE     Attending: Bonny Lunsford MD    Resident: Cayetano Manuel MD    PATIENT: Kris Ambriz; [id number]; 1945    ID: Kris Ambriz is a 76 y.o. male with hypertension, dyslipidemia, and GERD who presents after a ground-level fall at home, now with concern for cirrhosis of the liver.    SUBJECTIVE: No acute events overnight, patient reports that he is in no pain or discomfort.  He reports that he has not had a bowel movement in a few days, which is not abnormal for him.  He denies nausea.  He is coughing up some thick mucus, but there is no blood.    OBJECTIVE:  Temp:  [36.2 °C (97.1 °F)-37.2 °C (98.9 °F)] 36.7 °C (98.1 °F)  Pulse:  [87-97] 89  Resp:  [16-18] 16  BP: ()/(53-66) 97/53  SpO2:  [93 %-94 %] 94 %      Intake/Output Summary (Last 24 hours) at 3/26/2022 0707  Last data filed at 3/26/2022 0600  Gross per 24 hour   Intake 240 ml   Output 975 ml   Net -735 ml       PE:  General: No acute distress, resting comfortably in bed. Cachectic.  HEENT: NC/AT. EOMI. MMM  Cardiovascular: RRR, no m/r/g, normal S1/S2  Respiratory: Symmetric inspiratory effort. CTAB with no adventitious sounds  Abdomen: BS+, soft, non-tender, distended with ascites   EXT:  MAY, 2+ radial pulses, 2+ pitting edema LE bilaterally  Neuro: Non focal, alert and oriented x 4, no confusion    LABS:  Recent Labs     03/24/22  1640   WBC 10.3   RBC 3.67*   HEMOGLOBIN 12.2*   HEMATOCRIT 36.9*   .5*   MCH 33.2*   RDW 50.8*   PLATELETCT 275   MPV 8.8*   NEUTSPOLYS 87.20*   LYMPHOCYTES 5.90*   MONOCYTES 6.30   EOSINOPHILS 0.00   BASOPHILS 0.20     Recent Labs     03/24/22  1640 03/25/22  0250 03/26/22  0454   SODIUM 135 136 135   POTASSIUM 4.0 4.1 4.1   CHLORIDE 98 101 102   CO2 23 27 28   BUN 14 21 37*   CREATININE 0.83 0.71 0.82   CALCIUM 8.9 8.0* 8.2*   ALBUMIN 2.6*  --  2.0*     Estimated GFR/CRCL = Estimated Creatinine Clearance: 82.8 mL/min (by C-G formula based on SCr of 0.82 mg/dL).  Recent Labs      03/24/22  1640 03/25/22  0250 03/26/22  0454   GLUCOSE 139* 126* 116*     Recent Labs     03/24/22  1640 03/25/22  0250 03/26/22  0454   ASTSGOT 37  --  27   ALTSGPT 17  --  10   TBILIRUBIN 1.7*  --  0.8   ALKPHOSPHAT 117*  --  77   GLOBULIN 4.7*  --  3.3   INR 1.34*  --   --    AMMONIA  --  35  --              Recent Labs     03/24/22  1640   INR 1.34*   APTT 34.0       MICROBIOLOGY:  Results     ** No results found for the last 168 hours. **          IMAGING:  US-ABDOMEN COMPLETE SURVEY   Final Result         1.  Cirrhotic liver with marked amount of ascites throughout the abdomen and pelvis.      DX-CHEST-LIMITED (1 VIEW)   Final Result         Soft tissue prominence seen along the right heart border could relate to dilated cardiac chamber. A mass cannot be excluded. Further evaluation with CT recommended.      CT-HEAD W/O   Final Result      1. No CT evidence of acute infarct, hemorrhage or mass.   2. Moderate global parenchymal atrophy. Chronic small vessel ischemic changes.      CT-CHEST (THORAX) WITH    (Results Pending)   EC-ECHOCARDIOGRAM COMPLETE W/O CONT    (Results Pending)   CT-ABDOMEN-PELVIS WITH    (Results Pending)       MEDS:  Current Facility-Administered Medications   Medication Last Admin   • cyanocobalamin (VITAMIN B-12) tablet 1,000 mcg 1,000 mcg at 03/26/22 0514   • folic acid (FOLVITE) tablet 1 mg 1 mg at 03/26/22 0514   • pantoprazole (Protonix) injection 20 mg 20 mg at 03/26/22 0514   • spironolactone (ALDACTONE) tablet 100 mg 100 mg at 03/26/22 0514   • acetaminophen (Tylenol) tablet 650 mg 650 mg at 03/25/22 2048   • oxyCODONE immediate-release (ROXICODONE) tablet 5 mg 5 mg at 03/25/22 2145   • furosemide (LASIX) injection 40 mg 40 mg at 03/26/22 0514   • aspirin-dipyridamole (AGGRENOX)  MG 1 Capsule 1 Capsule at 03/26/22 0515   • simvastatin (ZOCOR) tablet 20 mg 20 mg at 03/25/22 2043   • terazosin (HYTRIN) capsule 10 mg 10 mg at 03/24/22 5949   • senna-docusate (PERICOLACE or  SENOKOT S) 8.6-50 MG per tablet 2 Tablet 2 Tablet at 03/26/22 0514    And   • polyethylene glycol/lytes (MIRALAX) PACKET 1 Packet      And   • magnesium hydroxide (MILK OF MAGNESIA) suspension 30 mL      And   • bisacodyl (DULCOLAX) suppository 10 mg     • labetalol (NORMODYNE/TRANDATE) injection 10 mg     • metoprolol tartrate (LOPRESSOR) tablet 50 mg 50 mg at 03/24/22 1296       PROBLEM LIST:  No problems updated.    ASSESSMENT/PLAN:   76-year-old male with newly diagnosed cirrhosis of the liver admitted after falling off of his bed onto the ground.    #Cirrhosis  #Protein calorie malnutrition  76-year-old male who does report nightly drinking which she stopped 3 years ago who presents with ground-level fall and is found to be too weak to ambulate. On exam he has abdominal ascites with 2+ pitting edema in the lower extremities bilaterally.  He is cachectic.  Labs reveal slightly elevated INR, normal ammonia.  - MELD 13  - US abdomen positive for cirrhotic liver and ascites.  - CT Abdomen to evaluate for liver pathology  - Spironolactone + lasix  - paracentesis tomorrow     #Heart Chamber Dilation, right sided  CXR was concerning for dilation of the heart possibly right atrium. Last echo in 2013 which was normal. .  - EKG  - Echo  - CT Chest    #HTN  Continue home meds:  - Metoprolol 50 mg p.o. daily  - DC HCTZ and lisinopril, pt has soft pressures and we are starting lasix + spironolactone     #Tachycardia, resolved  Patient reports he has not taken his home meds in 3 days.  Tachycardia could likely be attributed to metoprolol withdrawal  - Recovered after resuming home metoprolol    #Macrocytic anemia  Patient with macrocytic anemia likely secondary to dietary deficiencies versus malabsorption versus chronic blood loss. Niece reports possible bloody stool at home. Pt reports black stools 3 days ago.  Overnight, patient's hemoglobin dropped from 12.2-7.9.  Patient denies any rectal bleeding, hematemesis.   Repeat H&H at 11 AM, hemoglobin 8.5.  WBC and platelet count have changed proportionately to hemoglobin, however patient has not been receiving IV fluids.  -Type and screen  -Continue to monitor H&H closely  -Monitor for signs of bleeding  -Fecal occult blood test  -Transfuse for hemoglobin under 7  -Supplement B12 and folate  - protonix 20mg BID     #GERD  - protonix as above     #Social  Patient lives alone.  Discussed possible need for rehab and the patient is agreeable to this.  -PT recommends post-acute placement after discharge     Core Measures:  Fluids: none  Lines: Peripheral IV  Tubes: None  Diet: Sodium restricted  Abx: Not indicated  DVT Ppx: SCDs  GI Ppx: protonix BID  PCP: Kris Horn M.D.   CODE STATUS: DNR/DI     Dispo: Inpatient for further work-up and possible placement     Cayetano Manuel MD  PGY1  [facility]

## 2022-03-26 NOTE — DIETARY
Nutrition services: Day 0 of admit.  Kris Ambriz is a 76 y.o. male with admitting DX of weakness.     Consult received for unintentional wt loss of 24-33 lbs due to diarrhea 1 month ago (has since stopped) (MST 3) and failure to thrive. RD attempted 2x to visit pt at bedside but was unsuccessful. Per H&P pt reported a 30 lb wt loss ~1 month ago due to a diarrheal illness that has sense resolved. MD notes him to be cachectic with 2+ pitting edema in lower bilateral extremities. Pt with supplement order in place and high protein diet. RD to follow for adequate intake.       Assessment:  Height: 182.9 cm (6')  Weight: 86.2 kg (190 lb) via bed scale   Body mass index is 25.77 kg/m²., BMI classification: overweight   Diet/Intake: Regular with high protein and supplements 25-75% x 2 recorded meals.    Evaluation:   1. Pt with reported 30 lb wt los in one month. Pt with limited hx in chart unable to assess wt loss trend. RD to obtain interview and wt hx when able.   2. Current clinical picture and MD progress notes reviewed   3. Labs (3/26) Glu 116 (H), Ca 8.2 (L),   4. Meds Vit B12, Folic acid, Lasix, oxycodone, senna, aldactone  5. Skin: No noted pressure injuries or staged wounds.   6. +BM PTA    Malnutrition Risk: Pt with reported wt loss, unable to meet criteria per ASPEN guidelines.     Recommendations/Plan:  1. Regular diet with High protein as tolerated   2. Boost Plus Supplements TID    3. RD to obtain interview as able  4. Encourage intake of all meals and supplements   5. Document intake of all meals and supplements as % taken in ADL's to provide interdisciplinary communication across all shifts.   6. Monitor weight.  7. Nutrition rep will continue to see patient for ongoing meal and snack preferences.     RD following

## 2022-03-26 NOTE — CARE PLAN
The patient is Stable - Low risk of patient condition declining or worsening    Shift Goals  Clinical Goals: Safety, pain control, decrease edema  Patient Goals: Pain control  Family Goals: No family present at this time.    Problem: Knowledge Deficit - Standard  Goal: Patient and family/care givers will demonstrate understanding of plan of care, disease process/condition, diagnostic tests and medications  Outcome: Progressing     Problem: Skin Integrity  Goal: Skin integrity is maintained or improved  Outcome: Progressing     Problem: Fall Risk  Goal: Patient will remain free from falls  Outcome: Progressing     Problem: Pain - Standard  Goal: Alleviation of pain or a reduction in pain to the patient’s comfort goal  Outcome: Progressing     Progress made toward(s) clinical / shift goals:      Patient updated on current plan of care and verbalizes understanding.   Pain is controlled with current medications per MAR.   Fall education provided and patient verbalizes understanding.   Patient is on a waffle mattress, Q2 turns in place, and mepilex dressing to sacrum. Wound consult in.    Patient is not progressing towards the following goals:

## 2022-03-26 NOTE — CARE PLAN
The patient is Stable - Low risk of patient condition declining or worsening    Shift Goals  Clinical Goals: Safety, rest, sleep, pain management, increase urine output  Patient Goals: Sleep  Family Goals: N/A    Progress made toward(s) clinical / shift goals:     Problem: Skin Integrity  Goal: Skin integrity is maintained or improved  Outcome: Progressing     Problem: Fall Risk  Goal: Patient will remain free from falls  Outcome: Progressing     Problem: Pain - Standard  Goal: Alleviation of pain or a reduction in pain to the patient’s comfort goal  Outcome: Progressing       Patient is not progressing towards the following goals:      Problem: Knowledge Deficit - Standard  Goal: Patient and family/care givers will demonstrate understanding of plan of care, disease process/condition, diagnostic tests and medications  Outcome: Progressing

## 2022-03-26 NOTE — PROCEDURES
INDICATION: Ascites  PROCEDURE : Cayetano Manuel MD  ATTENDING PHYSICIAN: JOHN PAUL CR  In Attendance (Y/N) Y  Ultrasound used to faina location: Y     CONSENT:   During the informed consent discussion regarding the procedure, or treatment, I explained the following to the patient/designee:    a. Nature of the procedure or treatment and who will perform the procedure or treatment.    b. Necessity for procedure and the possible benefits.    c. Risks and complications (most common and serious).    d. Alternative treatments and the risks, benefits and side effects of each (including no treatment).    e. Likelihood of the patient achieving his/her goals without this procedure and surgery treatment.    f. Problems that might occur during the recuperation.    g. Conflicts of interest, if any: none     PROCEDURE SUMMARY:   A time-out was performed. My hands were washed immediately prior to  the procedure. I wore a surgical cap, mask with protective eyewear, and sterile gloves throughout the procedure. The area was  cleansed and draped in usual sterile fashion using chlorhexidine scrub.  Anesthesia was achieved with 1% lidocaine. The right of the abdomen was  prepped and draped in a sterile fashion using chlorhexidine scrub. 1% lidocaine was used to numb the skin, soft tissue and peritoneum. The  paracentesis catheter was inserted and advanced with negative pressure until straw colored fluid was aspirated. Approximately 60 mL of ascitic  fluid was collected and sent for laboratory analysis. The catheter was  then connected to the vaccutainer and 4.75 liters of additional ascitic  fluid were drained. The catheter was removed and no leaking was noted. A  bandaid was placed over the puncture wound. The patient tolerated the  procedure well without any immediate complications. Estimated blood loss  was 1ml.

## 2022-03-26 NOTE — PROGRESS NOTES
Received report from previous shift RN  Assessment complete.  A&O x 4. Patient calls appropriately.  Patient ambulates with x1 assist. Bed alarm on.   Patient has 6/10 pain with movement. Pain managed with prescribed medications.  Denies N&V. Tolerating regular diet.  Paracentesis 3/26 per Dr. Lan  + void, + flatus, - BM since PTA.  Patient denies SOB.  Patient resting in bed comfortably. Son at bedside  Review plan with of care with patient. Call light and personal belongings with in reach. Hourly rounding in place. All needs met at this time.

## 2022-03-27 NOTE — PROGRESS NOTES
[facility] MEDICINE PROGRESS NOTE     Attending: Bonny Lunsford MD    Resident: Cayetano Manuel MD    PATIENT: Kris Ambriz; [id number]; 1945    ID: Kris Ambriz is a 76 y.o. male with hypertension, dyslipidemia, and GERD who presents after a ground-level fall at home. Pt has newly diagnosed cirrhosis of the liver.    SUBJECTIVE: No acute events overnight,     OBJECTIVE:  Temp:  [35.9 °C (96.7 °F)-36.4 °C (97.6 °F)] 36.4 °C (97.6 °F)  Pulse:  [84-93] 84  Resp:  [16-18] 18  BP: (81-99)/(51-57) 92/57  SpO2:  [91 %-95 %] 94 %      Intake/Output Summary (Last 24 hours) at 3/27/2022 0540  Last data filed at 3/27/2022 0200  Gross per 24 hour   Intake 340 ml   Output 1050 ml   Net -710 ml       PE:   General: No acute distress, resting comfortably in bed. Cachectic.  HEENT: NC/AT. EOMI. MMM  Cardiovascular: RRR, no m/r/g, normal S1/S2  Respiratory: Symmetric inspiratory effort. CTAB with no adventitious sounds  Abdomen: BS+, soft, non-tender, distended with ascites   EXT:  MAY, 2+ radial pulses, 2+ pitting edema LE bilaterally  Neuro: Non focal, alert and oriented x 4, no confusion    LABS:  Recent Labs     03/24/22  1640 03/26/22  0603 03/26/22  1102 03/27/22  0256   WBC 10.3 5.0 6.5 6.8   RBC 3.67* 2.34* 2.57* 2.65*   HEMOGLOBIN 12.2* 7.9* 8.5* 8.7*   HEMATOCRIT 36.9* 24.0* 26.4* 27.1*   .5* 102.6* 102.7* 102.3*   MCH 33.2* 33.8* 33.1* 32.8   RDW 50.8* 53.3* 53.4* 54.2*   PLATELETCT 275 178 192 201   MPV 8.8* 9.7 9.5 9.5   NEUTSPOLYS 87.20*  --  74.40*  --    LYMPHOCYTES 5.90*  --  18.30*  --    MONOCYTES 6.30  --  6.70  --    EOSINOPHILS 0.00  --  0.30  --    BASOPHILS 0.20  --  0.00  --      Recent Labs     03/25/22  0250 03/26/22  0454 03/26/22  1642 03/27/22  0256   SODIUM 136 135  --  138   POTASSIUM 4.1 4.1  --  3.6   CHLORIDE 101 102  --  102   CO2 27 28  --  28   BUN 21 37*  --  34*   CREATININE 0.71 0.82  --  0.85   CALCIUM 8.0* 8.2*  --  8.6   ALBUMIN  --  2.0* 0.7 2.5*     Estimated GFR/CRCL = Estimated  Creatinine Clearance: 79.9 mL/min (by C-G formula based on SCr of 0.85 mg/dL).  Recent Labs     03/25/22  0250 03/26/22  0454 03/27/22 0256   GLUCOSE 126* 116* 102*     Recent Labs     03/24/22  1640 03/25/22  0250 03/26/22  0454 03/27/22 0256   ASTSGOT 37  --  27 32   ALTSGPT 17  --  10 11   TBILIRUBIN 1.7*  --  0.8 1.2   ALKPHOSPHAT 117*  --  77 71   GLOBULIN 4.7*  --  3.3 3.7*   INR 1.34*  --   --   --    AMMONIA  --  35  --   --              Recent Labs     03/24/22  1640   INR 1.34*   APTT 34.0       MICROBIOLOGY:  Results     Procedure Component Value Units Date/Time    FLUID CULTURE W/GRAM STAIN [[id number]] Collected: 03/26/22 1725    Order Status: No result Specimen: Body Fluid Updated: 03/26/22 1744     Significant Indicator NEG     Source BF     Site -     Culture Result -     Gram Stain Result -    FLUID CULTURE W/GRAM STAIN [[id number]] Collected: 03/26/22 1725    Order Status: Sent Specimen: Body Fluid from [facility] Fluid           IMAGING:  CT-CHEST,ABDOMEN,PELVIS WITH   Final Result      1.  Cirrhosis and very large volume ascites, extending into a large hiatal hernia. In the chest, it exhibits mass effect on the heart.   2.  Large left and moderate right pleural effusions and associated atelectasis.            EC-ECHOCARDIOGRAM COMPLETE W/O CONT   Final Result      US-ABDOMEN COMPLETE SURVEY   Final Result         1.  Cirrhotic liver with marked amount of ascites throughout the abdomen and pelvis.      DX-CHEST-LIMITED (1 VIEW)   Final Result         Soft tissue prominence seen along the right heart border could relate to dilated cardiac chamber. A mass cannot be excluded. Further evaluation with CT recommended.      CT-HEAD W/O   Final Result      1. No CT evidence of acute infarct, hemorrhage or mass.   2. Moderate global parenchymal atrophy. Chronic small vessel ischemic changes.          MEDS:  Current Facility-Administered Medications   Medication Last Admin   • metoprolol tartrate (LOPRESSOR)  tablet 12.5 mg     • spironolactone (ALDACTONE) tablet 50 mg     • furosemide (LASIX) injection 20 mg     • cyanocobalamin (VITAMIN B-12) tablet 1,000 mcg 1,000 mcg at 03/26/22 0514   • folic acid (FOLVITE) tablet 1 mg 1 mg at 03/26/22 0514   • pantoprazole (Protonix) injection 20 mg 20 mg at 03/26/22 1825   • acetaminophen (Tylenol) tablet 650 mg 650 mg at 03/25/22 2048   • oxyCODONE immediate-release (ROXICODONE) tablet 5 mg 5 mg at 03/27/22 0327   • aspirin-dipyridamole (AGGRENOX)  MG 1 Capsule 1 Capsule at 03/26/22 1822   • simvastatin (ZOCOR) tablet 20 mg 20 mg at 03/26/22 2023   • terazosin (HYTRIN) capsule 10 mg 10 mg at 03/24/22 2256   • senna-docusate (PERICOLACE or SENOKOT S) 8.6-50 MG per tablet 2 Tablet 2 Tablet at 03/26/22 1821    And   • polyethylene glycol/lytes (MIRALAX) PACKET 1 Packet      And   • magnesium hydroxide (MILK OF MAGNESIA) suspension 30 mL      And   • bisacodyl (DULCOLAX) suppository 10 mg     • labetalol (NORMODYNE/TRANDATE) injection 10 mg         PROBLEM LIST:  Problem Noted   Cirrhosis (Hcc) 3/26/2022       ASSESSMENT/PLAN: Kris Ambriz is a 76 y.o. male with hypertension, dyslipidemia, and GERD who presents after a ground-level fall at home. Pt has newly diagnosed cirrhosis of the liver.    #Cirrhosis  #Protein calorie malnutrition  76-year-old male who does report nightly drinking which she stopped 3 years ago who presents with ground-level fall and is found to be too weak to ambulate. On exam he has abdominal ascites with 2+ pitting edema in the lower extremities bilaterally.  He is cachectic.  Labs reveal slightly elevated INR, normal ammonia.  - MELD 12, Maddry 15.9  - US abdomen and CT abdomen positive for cirrhotic liver and ascites.  - Spironolactone + lasix, titrate up as tolerated    #Ascites 2/2 cirrhosis  #S/p paracentesis 3/26  Paracentesis performed 3/26 without complication.  - SAAG=1.8, positive for Portal Hypertension  - PMN=6 and no signs of infection,  unlikely SBP  - Albumin given 3/26  - BP's improved today    #Blood pressure control  Pt has a history of hypertension on lisinopril and HCTZ, but has been mildly hypotensive during this hospitalization. Pressures in 90's/50's overnight after paracentesis, but improved today  - Reduce metoprolol taratrate to 12.5 BID from 25 BID   - Needed for HR control, pt was tachycardic on admission due to not taking metoprolol  - Split lasix + spironolactone to BID dosing  - Continue to hold home lisinopril and HCTZ  - CTM vitals  - Re-evaluate after pressures normalize     #Hiatal Hernia  CXR initial read concerning for dilation of heart chamber, but echo was normal and CT Chest showed hiatal hernia.     #Tachycardia, resolved  Patient reports he has not taken his home meds in 3 days.  Tachycardia could likely be attributed to metoprolol withdrawal  - Recovered after resuming home metoprolol     #Macrocytic anemia  Concern for GI bleeds based on patient's history: he reports black stool last week and has portal hypertension, risk for esophageal varices. Type and screen completed  -Continue to monitor H&H closely  -Monitor for signs of bleeding  -FOBT ordered  -Transfuse for hemoglobin under 7  -Supplement B12 and folate  - protonix 20mg BID     #GERD  - protonix as above     #Social  Patient lives alone.  Discussed possible need for rehab and the patient is agreeable to this.  -PT recommends post-acute placement after discharge    Core Measures:  Fluids: None, diuresing  Lines: PIV  Abx: none  Diet: Regular, sodium restricted  PPX: protonix, SCD's    CODE STATUS: DNR/DNI    Cayetano Manuel MD  PGY1  [facility]

## 2022-03-27 NOTE — WOUND TEAM
[facility]  Inpatient Services  Wound and Skin Care Brief Evaluation    Admission Date: 3/24/2022     Last order of IP CONSULT TO WOUND CARE was found on 3/25/2022 from Hospital Encounter on 3/24/2022     HPI, PMH, SH: Reviewed    Chief Complaint   Patient presents with   • T-5000 Head Injury     Diagnosis: Weakness [R53.1]  Cirrhosis (HCC) [K74.60]    Unit where seen by Wound Team: T404/01     Wound consult placed regarding scattered abrasions. Chart and images reviewed. This discussed with bedside RN, Cassi. This RN in to assess patient. Pt pleasant and agreeable. Pt states he got some of the abrasions from recent fall while others have been present from old falls. Wounds are all dry and crusted partial thickness abrasions. No pressure injuries or advanced wound care needs identified. Wound consult completed. No further follow up unless indicated and consulted.

## 2022-03-27 NOTE — CARE PLAN
The patient is Stable - Low risk of patient condition declining or worsening    Shift Goals  Clinical Goals: Hemodynamic stability, safety, comfort, rest  Patient Goals: Rest  Family Goals: No family present at this time    Problem: Knowledge Deficit - Standard  Goal: Patient and family/care givers will demonstrate understanding of plan of care, disease process/condition, diagnostic tests and medications  Outcome: Progressing     Problem: Fall Risk  Goal: Patient will remain free from falls  Outcome: Progressing     Problem: Pain - Standard  Goal: Alleviation of pain or a reduction in pain to the patient’s comfort goal  Outcome: Progressing     Progress made toward(s) clinical / shift goals:      Patient updated on current plan of care and verbalizes understanding.   Pain is controlled with current medications per MAR.   Fall education provided and patient verbalizes understanding.     Patient is not progressing towards the following goals:

## 2022-03-27 NOTE — PROGRESS NOTES
Received report from night shift RN. Assumed patient care.    Patient is A+O x4.  Tolerating regular, high protein, 2 gram sodium restricted diet. Denies N/V. Poor PO intake.  Denies chest pain or SOB.  Pain 2 on a 0-10 pain scale.   + void, - BM. Last BM PTA.  Patient ambulates x1 assist with FWW.     Plan of care discussed, all questions answered. Educated on the importance of calling before getting OOB and pt verbalizes understanding. Educated regarding importance of oral care. Oral care kit at bedside. Call light is within reach, treaded slipper socks on, bed in lowest/ locked position, hourly rounding in place, all needs met at this time. Bed alarm is on.

## 2022-03-27 NOTE — PROGRESS NOTES
Received report from previous shift RN  Assessment complete.  A&O x4. Patient calls appropriately.  Patient ambulates with x2 assist. Bed alarm on.   Patient has 7/10 pain. Pain managed with prescribed medications.  Denies N&V. Tolerating reg diet.  + void, + flatus, - BM.  Patient denies SOB.  Patient family at bedside.  Review plan with of care with patient. Call light and personal belongings with in reach. Hourly rounding in place. All needs met at this time.

## 2022-03-28 NOTE — PROGRESS NOTES
Bedside report received.  Assessment complete.  A&O x 4. Patient calls appropriately.  Patient unable to ambulate, Q2 turns in place with 1-2 assist. Bed alarm on. Waffle mat in place, TAPS system in place.   Patient has 2-3/10 pain. Pain managed with prescribed medications.  Denies N&V. NPO diet.  Appropriate wound care performed.  + void marks in use, + flatus, + BM.  Patient denies SOB.  SCD's off.  Patient is mildly fatigued, pleasant, and cooperative with the care plan.  Review plan with of care with patient. Call light and personal belongings within reach. Hourly rounding in place. All needs met at this time.  /65   Pulse 92   Temp 37.2 °C (98.9 °F) (Temporal)   Resp 18   Ht 1.829 m (6')   Wt 86.2 kg (190 lb)   SpO2 93%   BMI 25.77 kg/m²

## 2022-03-28 NOTE — CARE PLAN
Problem: Knowledge Deficit - Standard  Goal: Patient and family/care givers will demonstrate understanding of plan of care, disease process/condition, diagnostic tests and medications  Outcome: Progressing     Problem: Skin Integrity  Goal: Skin integrity is maintained or improved  Outcome: Progressing     Problem: Fall Risk  Goal: Patient will remain free from falls  Outcome: Progressing     Problem: Pain - Standard  Goal: Alleviation of pain or a reduction in pain to the patient’s comfort goal  Outcome: Progressing   The patient is Stable - Low risk of patient condition declining or worsening    Shift Goals  Clinical Goals: pt comfort, safety  Patient Goals: rest  Family Goals: Get better    Progress made toward(s) clinical / shift goals:  patient turned Q2, TAPS system in place, skin care performed, marks catheter in use. Patient remained safe from falls and injury.    Patient is not progressing towards the following goals:

## 2022-03-28 NOTE — PROGRESS NOTES
Received report from previous shift RN  Assessment complete.  A&O x 4. Patient calls appropriately.  Patient ambulates with max assist. Bed alarm on.   Patient has 5/10 pain. Pain managed with non-pharmacological interventions as patient is NPO and without IV access  Denies N&V. Tolerating NPO diet.  + void, + flatus, - BM since PTA.  Patient denies SOB.  Patient resting in new room comfortably. Comfort care to speak with patient tomorrow. Family at bedside  Review plan with of care with patient. Call light and personal belongings with in reach. Hourly rounding in place. All needs met at this time.

## 2022-03-28 NOTE — THERAPY
"Speech Language Pathology   Clinical Swallow Evaluation     Patient Name: Kris Ambriz  AGE:  76 y.o., SEX:  male  Medical Record #: [id number]  Today's Date: 3/28/2022     Precautions  Precautions: Fall Risk,Other (See Comments)  Comments: muliple skin abraisons     HPI: Admitted 3/25/22 after GLF at home, malnourished, many skin abrasions, recent weight loss, significant generalized weakness    PMHx: hypertension, dyslipidemia, and GERD, newly diagnosed cirrhosis of liver    Subjective    Patient received awake/alert in bed with no initial complaints of pain. Pt reported feeling cold: 3 blankets provided per request. SAMMY Yang cleared pt for ST swallow eval with report of copious secretions. Suction present at bedside, pt audibly wet with semi-productive cough.      Level of Consciousness: Alert, Awake  Affect/Behavior: Appropriate, Calm, Cooperative  Follows Directives: Yes  Orientation: Oriented x 4  Hearing: Functional hearing  Vision: Functional vision, Wears corrective lenses    Prior Living Situation & Level of Function: Pt denies any hx of ST intervention or swallowing difficulty but confirms recent 30lb unintentional weight loss. Pt avoids many foods due to \"indigestion\" and reports \"I don't usually eat much after noon\"    Oral Mechanism Evaluation  Facial Symmetry: Equal  Facial Sensation: Equal  Labial Observations: WFL  Lingual Observations: General weakness observed  Dentition: Fair, Multiple carries, Natural dentition, Some missing dentition    Voice  Quality: Breathy transient, Wet  Resonance: WFL  Intensity: Soft  Cough: Perceptually weak    Current Method of Nutrition   NPO until cleared by speech pathology, previously on regular/thin diet    Assessment    Positioning: Wesley's (60-90 degrees)  Bolus Administration: SLP  Oxygen Requirements: Room Air  Factor(s) Affecting Performance: Impaired endurance, generalized weakness    Swallowing Trials  Ice: Impaired  Thin Liquid (TN0): Not tested  Mildly " Thick Liquid (MT2): Impaired  Liquidised (LQ3): Not tested  Pureed (PU4): Not tested  Minced & Moist (MM5): Not tested  Soft & Bite Sized (SB6): Not tested  Easy to Chew (EC7): Not tested  Regular (RG7): Not tested    Comments: Patient participated in OME, thorough oral cares with suction toothbrush, and minimal PO trials. Ice chip presented x2 with consistent use of multiple swallows (2-3) followed by audible wetness and delayed cough without expectoration. Single trial of mildly thick liquid provided via tsp with delayed swallow suspected upon palpation, perceived incomplete hyolaryngeal elevation, and delayed persistent cough with multiple episodes of expectoration of phlegm/secretions. Mild color change observed, suspicious of aspiration event. PO trials discontinued, pt with perception of difficulty.      Clinical Impressions: Clinical swallow evaluation initiated at bedside. Pt presents with SIGNIFICANT DYSPHAGIA, with suspected pharyngeal deficits with perceived weakness and incomplete hyolaryngeal elevation/excursion with delayed coughing suggestive of likely penetration/aspiration. Pt admits perception of difficulty with trials at bedside and agreeable to NPO status for safety. Pt may benefit from short term alternative means of nutrition if appropriate with goals of care, suggest further discussion with MD. Pt will likely benefit from a diagnostic swallow evaluation, when able to tolerate PO trials with SLP. Deficits severe enough to suggest poor tolerance of exam at this time.      Recommendations  1.  Consider NPO with Cortrak/Alt means. Minimal single ice chips 3-5/hr for oral gratification and perseveration of current swallow function, following oral cares only  2.  Instrumentation: FEES, Instrumental swallow study pending clinical progress  3.  Swallowing Instructions & Precautions: ICE CHIPS ONLY  Supervision: 1:1 feeding with constant supervision  Positioning: Fully upright and midline during oral  "intake  Medication: Non Oral   Strategies: Multiple swallows (x 3) per bite/sip   Oral Care: Q2h  4.  SLP following for dysphagia managemnent    Plan    Recommend Speech Therapy 4 times per week until therapy goals are met for the following treatments:  Dysphagia Training and Patient / Family / Caregiver Education.    Objective     03/28/22 0952   Prior Level Of Function   Communication Within Functional Limits   Swallow Unknown  (Pt reports no hx but significant weight loss is concerning)   Dentition Intact   Dentures None   Hearing Within Functional Limits for Evaluation   Hearing Aid None   Vision Within Functional Limits for Evaluation;Wears Corrective Lenses   Patient's Primary Language English   Oral Motor Eval    Is Patient Able to Complete Oral Motor Eval Yes but Impaired   Lingual Function   Lingual Structure At Rest Within Functional Limits   Lingual Protrude Minimal   Elevate Outside Mouth Moderate   Lateralization Minimal Right;Minimal Left   Oral Food Presentation   Ice Chips Moderate   Single Swallow Mildly Thick (2) - (Nectar Thick)  Severe   Tracheostomy   Tracheostomy  No   Dysphagia Strategies / Recommendations   Strategies / Interventions Recommended (Yes / No) Yes   Compensatory Strategies To Be Assessed   Diet / Liquid Recommendation NPO;Pre-Feeding Trials with SLP Only   Medication Administration    (Non-oral if feasible)   Therapy Interventions Dysphagia Therapy By Speech Language Pathologist;FEES Evaluation;Pharyngeal / Laryngeal Exercises   Follow Up SLP Evaluation repeat bedside to determine readiness for diagnostic   Dysphagia Rating   Nutritional Liquid Intake Rating Scale Nothing by mouth   Nutritional Food Intake Rating Scale Nothing by mouth   Patient / Family Goals   Patient / Family Goal #1 \"I'm juts not comfortable\"   Goal #1 Outcome   (New 3/28/22)   Short Term Goals   Short Term Goal # 1 Pt will safely tolerate pre-feeding trials with SLP without obvious s/sx of aspiration   Goal " Outcome # 1   (New 3/28/22)   Short Term Goal # 2 Pt will exhibit readiness to swallow PO consistencies for diagnostic swallow evaluation (e.g FEES)   Goal Outcome # 2    (New 3/28/22)

## 2022-03-28 NOTE — PROGRESS NOTES
[facility] MEDICINE PROGRESS NOTE     Attending: Bonny Lunsford MD     Resident: Cayetano Manuel MD     PATIENT: Kris Ambriz; [id number]; 1945     ID: Kris Ambriz is a 76 y.o. male with hypertension, dyslipidemia, and GERD who presents after a ground-level fall at home. Pt has newly diagnosed cirrhosis of the liver.     SUBJECTIVE: No acute events overnight, pt continues to feel weak and tired. Nursing unable to use a condom cath; pt consents to marks. Discussed goals of care today as well as the role of palliative care; patient consented to seeing palliative team today.    OBJECTIVE:  Temp:  [36.1 °C (97 °F)-36.8 °C (98.3 °F)] 36.1 °C (97 °F)  Pulse:  [64-88] 64  Resp:  [18] 18  BP: ()/(55-64) 95/57  SpO2:  [90 %-93 %] 90 %      Intake/Output Summary (Last 24 hours) at 3/28/2022 0910  Last data filed at 3/28/2022 0350  Gross per 24 hour   Intake 0 ml   Output 150 ml   Net -150 ml       PE:  General: No acute distress, resting comfortably in bed. Cachectic. Weak, speaks slowly. Appears tired.  HEENT: NC/AT. EOMI. MMM  Cardiovascular: RRR, no m/r/g, normal S1/S2  Respiratory: Symmetric inspiratory effort. CTAB with no adventitious sounds  Abdomen: BS+, soft, non-tender, distended with ascites   EXT:  MAY, 2+ radial pulses, 2+ pitting edema LE bilaterally  Neuro: Non focal, alert and oriented x 4, no confusion    LABS:  Recent Labs     03/26/22  1102 03/27/22  0256 03/28/22  0352   WBC 6.5 6.8 5.9   RBC 2.57* 2.65* 2.39*   HEMOGLOBIN 8.5* 8.7* 7.9*   HEMATOCRIT 26.4* 27.1* 23.8*   .7* 102.3* 99.6*   MCH 33.1* 32.8 33.1*   RDW 53.4* 54.2* 52.7*   PLATELETCT 192 201 189   MPV 9.5 9.5 9.3   NEUTSPOLYS 74.40*  --  70.80   LYMPHOCYTES 18.30*  --  19.50*   MONOCYTES 6.70  --  9.00   EOSINOPHILS 0.30  --  0.30   BASOPHILS 0.00  --  0.20     Recent Labs     03/26/22  0454 03/26/22  1642 03/27/22  0256 03/28/22  0352   SODIUM 135  --  138 139   POTASSIUM 4.1  --  3.6 3.7   CHLORIDE 102  --  102 103   CO2 28  --   28 27   BUN 37*  --  34* 31*   CREATININE 0.82  --  0.85 0.81   CALCIUM 8.2*  --  8.6 8.2*   ALBUMIN 2.0* 0.7 2.5* 2.2*     Estimated GFR/CRCL = Estimated Creatinine Clearance: 83.8 mL/min (by C-G formula based on SCr of 0.81 mg/dL).  Recent Labs     03/26/22 0454 03/27/22 0256 03/28/22  0352   GLUCOSE 116* 102* 97     Recent Labs     03/26/22 0454 03/27/22 0256 03/28/22  0352   ASTSGOT 27 32 24   ALTSGPT 10 11 10   TBILIRUBIN 0.8 1.2 1.3   ALKPHOSPHAT 77 71 58   GLOBULIN 3.3 3.7* 3.3             No results for input(s): INR, APTT, FIBRINOGEN in the last 72 hours.    Invalid input(s): DIMER    MICROBIOLOGY:  Results     Procedure Component Value Units Date/Time    FLUID CULTURE W/GRAM STAIN [[id number]] Collected: 03/26/22 1725    Order Status: Completed Specimen: Body Fluid Updated: 03/28/22 0735     Significant Indicator NEG     Source BF     Site Paracentesis     Culture Result No growth at 48 hours.     Gram Stain Result No organisms seen.    GRAM STAIN [[id number]] Collected: 03/26/22 1725    Order Status: Completed Specimen: Body Fluid Updated: 03/27/22 1307     Significant Indicator .     Source BF     Site Paracentesis     Gram Stain Result No organisms seen.    FLUID CULTURE W/GRAM STAIN [[id number]] Collected: 03/26/22 1725    Order Status: Canceled Specimen: Other from Ascities Fluid           IMAGING:  CT-CHEST,ABDOMEN,PELVIS WITH   Final Result      1.  Cirrhosis and very large volume ascites, extending into a large hiatal hernia. In the chest, it exhibits mass effect on the heart.   2.  Large left and moderate right pleural effusions and associated atelectasis.            EC-ECHOCARDIOGRAM COMPLETE W/O CONT   Final Result      US-ABDOMEN COMPLETE SURVEY   Final Result         1.  Cirrhotic liver with marked amount of ascites throughout the abdomen and pelvis.      DX-CHEST-LIMITED (1 VIEW)   Final Result         Soft tissue prominence seen along the right heart border could relate to dilated cardiac  chamber. A mass cannot be excluded. Further evaluation with CT recommended.      CT-HEAD W/O   Final Result      1. No CT evidence of acute infarct, hemorrhage or mass.   2. Moderate global parenchymal atrophy. Chronic small vessel ischemic changes.          MEDS:  Current Facility-Administered Medications   Medication Last Admin   • metoprolol tartrate (LOPRESSOR) tablet 12.5 mg     • spironolactone (ALDACTONE) tablet 50 mg 50 mg at 03/27/22 0605   • furosemide (LASIX) injection 20 mg 20 mg at 03/27/22 0605   • menthol (Halls) lozenge 1 Lozenge     • cyanocobalamin (VITAMIN B-12) tablet 1,000 mcg 1,000 mcg at 03/27/22 0605   • folic acid (FOLVITE) tablet 1 mg 1 mg at 03/27/22 0606   • pantoprazole (Protonix) injection 20 mg 20 mg at 03/27/22 0605   • acetaminophen (Tylenol) tablet 650 mg 650 mg at 03/25/22 2048   • oxyCODONE immediate-release (ROXICODONE) tablet 5 mg 5 mg at 03/27/22 1627   • aspirin-dipyridamole (AGGRENOX)  MG 1 Capsule 1 Capsule at 03/27/22 0628   • simvastatin (ZOCOR) tablet 20 mg 20 mg at 03/26/22 2023   • terazosin (HYTRIN) capsule 10 mg 10 mg at 03/24/22 2256   • senna-docusate (PERICOLACE or SENOKOT S) 8.6-50 MG per tablet 2 Tablet 2 Tablet at 03/27/22 0606    And   • polyethylene glycol/lytes (MIRALAX) PACKET 1 Packet      And   • magnesium hydroxide (MILK OF MAGNESIA) suspension 30 mL      And   • bisacodyl (DULCOLAX) suppository 10 mg     • labetalol (NORMODYNE/TRANDATE) injection 10 mg         PROBLEM LIST:  Problem Noted   Cirrhosis (Hcc) 3/26/2022       ASSESSMENT/PLAN: Kris Ambriz is a 76 y.o. male with hypertension, dyslipidemia, and GERD who presents after a ground-level fall at home. Pt has newly diagnosed cirrhosis of the liver.     #Cirrhosis  #Protein calorie malnutrition  76-year-old male who does report nightly drinking which she stopped 3 years ago who presents with ground-level fall and is found to be too weak to ambulate. On exam he has abdominal ascites with 2+  pitting edema in the lower extremities bilaterally.  He is cachectic.  Labs reveal slightly elevated INR, normal ammonia.  Pt presented with cachexia and weakness, but the weakness and tiredness have progressed during this hospitalization. He now has difficulty reaching for objects due to weakness. His speech has slowed due to weakness/tiredness, although he has no sign of confusion or encephalopathy. He has started to aspirate on food, water, and PO meds. He also reports having no appetite. While his labs and vitals appear stable and his MELD score is 12-13, clinically he appears to be deconditioned and worsening.  - MELD 12, Maddry 15.9  - US abdomen and CT abdomen positive for cirrhotic liver and ascites.  - Spironolactone + lasix, titrate up as tolerated  - Palliative consult  - Goals of care discussion today to determine direction of care.     #Aspiration  Pt reports aspirating on foods and liquids. This is likely related to increased weakness.  - SLP swallow study  - NPO until SLP sees him    #Ascites 2/2 cirrhosis  #S/p paracentesis 3/26  Paracentesis performed 3/26 without complication.  - SAAG=1.8, positive for Portal Hypertension  - PMN=6 and no signs of infection, unlikely SBP  - Albumin given 3/26  - BP's stable     #Blood pressure control  Pt has a history of hypertension on lisinopril and HCTZ, but has been mildly hypotensive during this hospitalization. Pressures in 90's/50's overnight after paracentesis, but improved today  - Reduce metoprolol taratrate to 12.5 BID from 25 BID              - Needed for HR control, pt was tachycardic on admission due to not taking metoprolol  - Split lasix + spironolactone to BID dosing  - Continue to hold home lisinopril and HCTZ  - CTM vitals     #Hiatal Hernia  CXR initial read concerning for dilation of heart chamber, but echo was normal and CT Chest showed hiatal hernia.     #Tachycardia, resolved  Patient reports he has not taken his home meds in 3 days.   Tachycardia could likely be attributed to metoprolol withdrawal  - Recovered after resuming home metoprolol     #Macrocytic anemia  Concern for GI bleeds based on patient's history: he reports black stool last week and has portal hypertension, risk for esophageal varices. Type and screen completed  -Continue to monitor H&H closely  -Monitor for signs of bleeding  -FOBT ordered  -Transfuse for hemoglobin under 7  -Supplement B12 and folate  - protonix 20mg BID     #GERD  - protonix as above     #Social  Patient lives alone.  Discussed possible need for rehab and the patient is agreeable to this.  -PT recommends post-acute placement after discharge     Core Measures:  Fluids: None, diuresing  Lines: PIV  Abx: none  Diet: Regular, sodium restricted  PPX: protonix, SCD's     CODE STATUS: DNR/DNI     Cayetano Manuel MD  PGY1  [facility]

## 2022-03-28 NOTE — DISCHARGE PLANNING
Diagnosis medical debility . Ongoing medical management as well as therapy need. Anticipate post acute services to facilitate a successful transition to community. Discharge support son. Please consider a PMR referral to assist with plan of care. Medicare provider.

## 2022-03-28 NOTE — PROGRESS NOTES
"Day shift nurse SAMMY Ye was able to sit and speak with patient in regards to his code status. Home page states \"DNAR/DNI\" while his official orders state \"DNAR, intubation okay.\" Patient has made it very clear that he does not wish to be intubated. Comfort care to come speak with the patient in the morning, palliative or MD to be notified of these wishes so they may also confirm with the patient and unify the two code orders.   "

## 2022-03-29 NOTE — CONSULTS
"Reason for PC Consult: Advance Care Planning    Consulted by: Dr Manuel; [facility]    Assessment:  General: 75 y/o male from home after a GLF. Pt with significant cirrhosis and FTT. He appears cachectic, frail and weak, as well as endorses weakness. Pt with history of EtOH abuse, but ended in 2018. Pt with significant weakness and dysphagia, with notable difficulty even managing secretions with this RN present. PMHx of HTN, HLD, and GERD    Prior PC Consults: n/a    Social: Pt resides alone and has one child, Joaquin who lives locally. He has a niece Bonny who is also local. He reported a sister who is a good source of support but he was having difficulty recalling her name. 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     Consults: n/a    Dyspnea: No-    Last BM: 22 (per patient)-    Pain: No-    Depression: Unable to determine-    Dementia: No;       Spiritual:  Is Baptism or spirituality important for coping with this illness? No-    Has a  or spiritual provider visit been requested? No    Palliative Performance Scale: 50%    Advance Directive: None- states he has a full trust at home that his son is aware of  DPOA: No-    POLST: No-      Code Status: DNR-      Outcome:  Case briefly discussed with Dr. Manuel prior to visit.    PC RN met with Kris at bedside and explained the role of PC to help with discussions about the current medical situation and goals moving forward.. He was very hoarse, and had some difficulty articulating words d/t weakness. He shared is family history, as well as some of his work history. He recalls \"slipping off the bed\" at home and this leading to his admission. PC asked what his understanding of his medical situation is and he was unable to recall specifics, but would state \"sometimes they " "tell me I'm better and other times they tell me I'm not so good.\" PC provided an overview of the concerns with his liver and swallowing function, which he acknowledged. He denied questions about these topics.     PC explored his values, beliefs, and preferences in order to identify GOC, including his goal for future care. PC discussed skilled therapies and feeding tubes vs eating despite risk and hospice care. He again noted not having any questions about this, but also denied having any feelings one way or another. PC asked if his goal was to get stronger and he said \"I guess so.\" PC also queried his wishes surrounding the feeding tube or whether this was not in line with his goals, but again, he was not very forthcoming with his responses and said \"I guess I'd give it a try.\" PC shared the option for focusing on comfort and EoL if he felt feeding tubes were not something he wanted to pursue, or if doing this was not helpful for him and he was not able to provide a response. Kris was getting more fatigued and PC offered to reach out to his son, which he was agreeable to.    While talking to Kris, PC RN provided therapeutic communication, including open ended questions, reflective listening, and normalization of thought and feelings throughout encounter, as well as reinforced his goals of care. PC contact information provided to Aditya and encouraged to call with any questions or concerns. Update to BS RN noting plan to call Joaquin, but also this RN's contact information at . Son was planning to be at  this AM but had not arrived yet.    Call placed to Joaquin; no answer and message left requesting a call back to discuss POC for Kris.    Recommendations: I recommend a hospice consult.- if pt opts for no feeding tube    Updated: BS RN    Plan: Await call back from son to further discuss POC    Thank you for allowing Palliative Care to support this patient and family. Contact [phone] for additional " assistance, change in patient status, or with any questions/concerns.

## 2022-03-29 NOTE — CARE PLAN
Problem: Knowledge Deficit - Standard  Goal: Patient and family/care givers will demonstrate understanding of plan of care, disease process/condition, diagnostic tests and medications  Outcome: Progressing     Problem: Skin Integrity  Goal: Skin integrity is maintained or improved  Outcome: Progressing     Problem: Fall Risk  Goal: Patient will remain free from falls  Outcome: Progressing     Problem: Pain - Standard  Goal: Alleviation of pain or a reduction in pain to the patient’s comfort goal  Outcome: Progressing   The patient is Watcher - Medium risk of patient condition declining or worsening    Shift Goals  Clinical Goals: pt safety, pt comfort  Patient Goals: rest  Family Goals: Get better    Progress made toward(s) clinical / shift goals:  Patient remained safe from falls and injury. Patient turned Q2, TAPS system in use, heel float boots in use, skin/wound care performed.    Patient is not progressing towards the following goals:

## 2022-03-29 NOTE — PROGRESS NOTES
[facility] MEDICINE PROGRESS NOTE     Attending: Bonny Lunsford MD     Resident: Cayetano Manuel MD     PATIENT: Kris Ambriz; [id number]; 1945     ID: Kris Ambriz is a 76 y.o. male with hypertension, dyslipidemia, and GERD who presents after a ground-level fall at home. Pt has newly diagnosed cirrhosis of the liver.    SUBJECTIVE: Pt required IV pain medications overnight. He now has IV access. Pt is unsure of his goals of care and seems to change his decision with different people. He has consented to talk to Palliative care.    OBJECTIVE:  Temp:  [36.9 °C (98.5 °F)-37.6 °C (99.7 °F)] 36.9 °C (98.5 °F)  Pulse:  [87-93] 87  Resp:  [16-18] 16  BP: ()/(53-65) 87/53  SpO2:  [91 %-95 %] 94 %      Intake/Output Summary (Last 24 hours) at 3/29/2022 0608  Last data filed at 3/29/2022 0410  Gross per 24 hour   Intake --   Output 750 ml   Net -750 ml       PE:  General: No acute distress, resting comfortably in bed. Cachectic. Weak, speaks and moves slowly. Appears tired.  HEENT: NC/AT. EOMI. MMM  Cardiovascular: RRR, no m/r/g, normal S1/S2  Respiratory: Symmetric inspiratory effort. CTAB with no adventitious sounds  Abdomen: BS+, soft, non-tender, distended with ascites   EXT:  MAY, 2+ radial pulses, 2+ pitting edema LE bilaterally  Neuro: Non focal, alert and oriented x 4, no confusion    LABS:  Recent Labs     03/26/22  1102 03/27/22  0256 03/28/22  0352   WBC 6.5 6.8 5.9   RBC 2.57* 2.65* 2.39*   HEMOGLOBIN 8.5* 8.7* 7.9*   HEMATOCRIT 26.4* 27.1* 23.8*   .7* 102.3* 99.6*   MCH 33.1* 32.8 33.1*   RDW 53.4* 54.2* 52.7*   PLATELETCT 192 201 189   MPV 9.5 9.5 9.3   NEUTSPOLYS 74.40*  --  70.80   LYMPHOCYTES 18.30*  --  19.50*   MONOCYTES 6.70  --  9.00   EOSINOPHILS 0.30  --  0.30   BASOPHILS 0.00  --  0.20     Recent Labs     03/26/22  1642 03/27/22  0256 03/28/22  0352   SODIUM  --  138 139   POTASSIUM  --  3.6 3.7   CHLORIDE  --  102 103   CO2  --  28 27   BUN  --  34* 31*   CREATININE  --  0.85 0.81    CALCIUM  --  8.6 8.2*   ALBUMIN 0.7 2.5* 2.2*     Estimated GFR/CRCL = Estimated Creatinine Clearance: 83.8 mL/min (by C-G formula based on SCr of 0.81 mg/dL).  Recent Labs     03/27/22  0256 03/28/22  0352   GLUCOSE 102* 97     Recent Labs     03/27/22  0256 03/28/22  0352   ASTSGOT 32 24   ALTSGPT 11 10   TBILIRUBIN 1.2 1.3   ALKPHOSPHAT 71 58   GLOBULIN 3.7* 3.3             No results for input(s): INR, APTT, FIBRINOGEN in the last 72 hours.    Invalid input(s): DIMER    MICROBIOLOGY:  Results     Procedure Component Value Units Date/Time    FLUID CULTURE W/GRAM STAIN [[id number]] Collected: 03/26/22 1725    Order Status: Completed Specimen: Body Fluid Updated: 03/28/22 0735     Significant Indicator NEG     Source BF     Site Paracentesis     Culture Result No growth at 48 hours.     Gram Stain Result No organisms seen.    GRAM STAIN [[id number]] Collected: 03/26/22 1725    Order Status: Completed Specimen: Body Fluid Updated: 03/27/22 1307     Significant Indicator .     Source BF     Site Paracentesis     Gram Stain Result No organisms seen.    FLUID CULTURE W/GRAM STAIN [[id number]] Collected: 03/26/22 1725    Order Status: Canceled Specimen: Other from Ascities Fluid           IMAGING:  CT-CHEST,ABDOMEN,PELVIS WITH   Final Result      1.  Cirrhosis and very large volume ascites, extending into a large hiatal hernia. In the chest, it exhibits mass effect on the heart.   2.  Large left and moderate right pleural effusions and associated atelectasis.            EC-ECHOCARDIOGRAM COMPLETE W/O CONT   Final Result      US-ABDOMEN COMPLETE SURVEY   Final Result         1.  Cirrhotic liver with marked amount of ascites throughout the abdomen and pelvis.      DX-CHEST-LIMITED (1 VIEW)   Final Result         Soft tissue prominence seen along the right heart border could relate to dilated cardiac chamber. A mass cannot be excluded. Further evaluation with CT recommended.      CT-HEAD W/O   Final Result      1. No CT  evidence of acute infarct, hemorrhage or mass.   2. Moderate global parenchymal atrophy. Chronic small vessel ischemic changes.          MEDS:  Current Facility-Administered Medications   Medication Last Admin   • HYDROmorphone (Dilaudid) injection 0.5 mg 0.5 mg at 03/29/22 0113   • NS infusion New Bag at 03/28/22 2106   • metoprolol tartrate (LOPRESSOR) tablet 12.5 mg     • spironolactone (ALDACTONE) tablet 50 mg 50 mg at 03/27/22 0605   • furosemide (LASIX) injection 20 mg 20 mg at 03/27/22 0605   • menthol (Halls) lozenge 1 Lozenge 1 Lozenge at 03/28/22 1606   • cyanocobalamin (VITAMIN B-12) tablet 1,000 mcg 1,000 mcg at 03/27/22 0605   • folic acid (FOLVITE) tablet 1 mg 1 mg at 03/27/22 0606   • pantoprazole (Protonix) injection 20 mg 20 mg at 03/29/22 0423   • acetaminophen (Tylenol) tablet 650 mg 650 mg at 03/25/22 2048   • oxyCODONE immediate-release (ROXICODONE) tablet 5 mg 5 mg at 03/28/22 1949   • aspirin-dipyridamole (AGGRENOX)  MG 1 Capsule 1 Capsule at 03/27/22 0628   • simvastatin (ZOCOR) tablet 20 mg 20 mg at 03/26/22 2023   • terazosin (HYTRIN) capsule 10 mg 10 mg at 03/24/22 2256   • senna-docusate (PERICOLACE or SENOKOT S) 8.6-50 MG per tablet 2 Tablet 2 Tablet at 03/27/22 0606    And   • polyethylene glycol/lytes (MIRALAX) PACKET 1 Packet      And   • magnesium hydroxide (MILK OF MAGNESIA) suspension 30 mL      And   • bisacodyl (DULCOLAX) suppository 10 mg     • labetalol (NORMODYNE/TRANDATE) injection 10 mg         PROBLEM LIST:  Problem Noted   Cirrhosis (Hcc) 3/26/2022       ASSESSMENT/PLAN:Kris Ambriz is a 76 y.o. male with hypertension, dyslipidemia, and GERD who presents after a ground-level fall at home. Pt has newly diagnosed cirrhosis of the liver.     #Cirrhosis  #Protein calorie malnutrition  #Cachexia  #Ascites s/p paracentesis 3/26  Pt presented with cachexia and weakness, but the weakness and tiredness have progressed during this hospitalization. He now has difficulty reaching  for objects due to weakness. His speech has slowed due to weakness/tiredness, although he has no sign of confusion or encephalopathy. He has started to aspirate on food, water, and PO meds. He also reports having no appetite. Clinically he appears to be deconditioned and worsening.  - Palliative will see him today  - Goals of care discussion yesterday to determine direction of care   - Pt will consider possible discussion with family as well  - Holding Spironolactone + lasix (due to soft BP + starting fluids)  - Will consider NGT if it aligns with pt's goals of care  - Hold all PO meds due to aspiration risk. Will switch to NGT/IV if pt decides against comfort care.    #Aspiration  Pt reports aspirating on foods and liquids. This is likely related to increased weakness.  - SLP: Significant dysphagia   - Coretrak recommended   - NPO with ice chips   - Fluids with NPO   - Holding Spironolactone + lasix (BP soft + starting fluids)     #Blood pressure control  Pt has a history of hypertension on lisinopril and HCTZ, but has been mildly hypotensive during this hospitalization. Pressures in 90's/50's overnight after paracentesis, but improved today  - Reduce metoprolol taratrate to 12.5 BID from 25 BID              - Needed for HR control, pt was tachycardic on admission due to not taking metoprolol  - Held diuretics  - Continue to hold home lisinopril and HCTZ  - CTM vitals     #Hiatal Hernia  CXR initial read concerning for dilation of heart chamber, but echo was normal and CT Chest showed hiatal hernia.     #Tachycardia, resolved  Patient reports he has not taken his home meds in 3 days.  Tachycardia could likely be attributed to metoprolol withdrawal  - Recovered after resuming home metoprolol     #Macrocytic anemia  Concern for GI bleeds based on patient's history: he reports black stool last week and has portal hypertension, risk for esophageal varices. Type and screen completed  -Continue to monitor H&H  closely  -Monitor for signs of bleeding  -FOBT ordered  -Transfuse for hemoglobin under 7  -Supplement B12 and folate  - protonix 20mg BID    #GERD  - protonix as above     #Social  Patient lives alone.  Discussed possible need for rehab and the patient is agreeable to this.  -PT recommends post-acute placement after discharge     Core Measures:  Fluids: D5 1/2 NS 50 cc/hr  Lines: PIV  Abx: none  Diet: Regular, sodium restricted  PPX: protonix, SCD's     CODE STATUS: DNR/DNI     Cayetano Manuel MD  PGY1  [facility]

## 2022-03-29 NOTE — THERAPY
Speech Language Pathology  Daily Treatment     Patient Name: Kris Ambriz  Age:  76 y.o., Sex:  male  Medical Record #: [id number]  Today's Date: 3/29/2022     Precautions  Precautions: Fall Risk,Other (See Comments)  Comments: muliple skin abraisons    Assessment    Patient seen this AM for reassessment of PO readiness. Pt remains very weak with poor endurance, audible wet congestion and reliance on yankauer suction. Oral cares provided with suction toothbrush which resulted in mild improvement of transient breathy voice. Pt with cup of ice in his hand in reclined position, requiring review of education of optimal positioning for oral gratification trials. Mildly thick water presented on spoon and pt fed himself with small 1/8 -1/4 tsp tastes followed by delayed swallow, poor ability to elicit secondary swallows on request. Initial trials tolerated without obvious difficulty, however with ongoing trials he exhibited delayed throat clear with expectoration of phlegm/secretions and possibly thickened water. Pt is HIGHLY UNLIKELY to meet nutritional needs by mouth at this time. Across session he consumed <1oz of thickened water with maximal effort and rapid fatigue, with ongoing HIGH ASPIRATION RISK. Diagnostic evaluation anticipated in the future but would be more appropriate after a few days of nutrition to allow for increased recovery/strength. Consider Cortrak if appropriate with goals of care.     Recommendations  1.  Consider NPO with Cortrak/Alt means. Minimal single ice chips 3-5/hr for oral gratification and perseveration of current swallow function, following oral cares only  2.  Instrumentation: FEES, Instrumental swallow study pending clinical progress  3.  Swallowing Instructions & Precautions: ICE CHIPS ONLY  Supervision: 1:1 feeding with constant supervision  Positioning: Fully upright and midline during oral intake  Medication: Non Oral   Strategies: Multiple swallows (x 3) per bite/sip   Oral Care:  "Q2h  4.  SLP following for dysphagia management    Plan    Continue current treatment plan.    Discharge Recommendations: Recommend post-acute placement for additional speech therapy services prior to discharge home    Subjective    Pt received awake/alert in bed. No cortrak placed, apparently pt/son declined yesterday. Pt now reports, \"I think that feeding tube would be good, as long as they don't put me under\". Pt under the impression he verbalized this to physician this AM though RN confirms this did not take place. SLP notified physician of pt comments, may be appropriate to readdress though pt also pending palliative care meeting today.     Objective     03/29/22 1006   Dysphagia    Dysphagia X   Positioning / Behavior Modification Self Monitoring;Effortful Swallow;Multiple Swallows   Oral / Pharyngeal / Laryngeal Exercises Other (see Comments)  (rapid fatigue, not yet appropriate)   Diet / Liquid Recommendation NPO;Pre-Feeding Trials with SLP Only   Nutritional Liquid Intake Rating Scale Nothing by mouth   Nutritional Food Intake Rating Scale Nothing by mouth   Nursing Communication Swallow Precaution Sign Posted at Head of Bed   Recommended Route of Medication Administration   Medication Administration    (non oral if feasible)   Patient / Family Goals   Patient / Family Goal #1 \"I'm juts not comfortable\"   Goal #1 Outcome Progressing slower than expected   Short Term Goals   Short Term Goal # 1 Pt will safely tolerate pre-feeding trials with SLP without obvious s/sx of aspiration   Goal Outcome # 1 Progressing slower than expected   Short Term Goal # 2 Pt will exhibit readiness to swallow PO consistencies for diagnostic swallow evaluation (e.g FEES)   Goal Outcome # 2  Progressing slower than expected     "

## 2022-03-29 NOTE — WOUND TEAM
[facility]  Inpatient Services  Initial Wound and Skin Care Evaluation    Admission Date: 3/24/2022     Last order of IP CONSULT TO WOUND CARE was found on 3/27/2022 from Hospital Encounter on 3/24/2022     HPI, PMH, SH: Reviewed    Past Surgical History:   Procedure Laterality Date   • PARACENTESIS  3/26/2022        • INGUINAL HERNIA REPAIR  7/18/2012    Performed by BART FRAZIER at SURGERY [facility]   • HERNIA REPAIR     • OTHER ORTHOPEDIC SURGERY       Social History     Tobacco Use   • Smoking status: Never Smoker   • Smokeless tobacco: Never Used   Substance Use Topics   • Alcohol use: Not Currently     Comment: daily shot and /or glass of wine     Chief Complaint   Patient presents with   • T-5000 Head Injury     Diagnosis: Weakness [R53.1]  Cirrhosis (HCC) [K74.60]    Unit where seen by Wound Team: T432/02     WOUND CONSULT/FOLLOW UP RELATED TO:  Back and sacrum     WOUND HISTORY:  Pt is an elderly gentleman admitted for weakness after falling out of bed. Per chart review pt has had significant weight gain of late and frequent falls. Pt was found to have abrasions and bruising as well as wounds to upper back and sacrum for which wound team was asked to evaluate and treat.    WOUND ASSESSMENT/LDA  Skin Risk/Bob   Sensory Perception: No Impairment  Moisture: Occasionally Moist  Activity: Bedfast  Mobility: Very Limited  Nutrition: Probably Inadequate  Friction and Shear: Problem  Total Score: 13  Skin Breakdown Risk: 13-17 Moderate Risk for Skin Breakdown    Sensory Interventions  Bed Types: Pressure Redistribution Mattress (Atmosair)  Skin Preventative Measures: Pillows in Use for Support / Positioning,Silicone Oxygen Tubing in Use,Gray Foam for Oxygen Tubing (Pad ear protector),Wedges in Use for Positioning,Remove Glasses While Patient is Sleeping (TAPS system in use)  Skin Preventative Dressing: Mepilex  Moisturizers/Barriers: Barrier Wipes,Barrier Paste,Containment Devices  PT /  OT Involved in Care: Physical Therapy Involved,Occupational Therapy Involved  Activity : Bed  Patient Turns / Repositioning: Right Side,Reposition - RUE,Reposition - LUE,Reposition - RLE,Reposition - LLE  Patient is Receiving Nutrition: Nothing by Mouth  Nutrition Consult Ordered: No, Consult has Already been Placed  Vitamin Therapy in Use: No  Friction Interventions: Draw Sheet / Pad Used for Repositioning                         Wound 03/24/22 Pressure Injury Back Upper Mid (POA) found down (Active)   Wound Image    03/29/22 1500   Site Assessment Yellow;Red 03/29/22 1500   Periwound Assessment Clean;Dry;Intact 03/29/22 1500   Margins Attached edges;Defined edges 03/29/22 1500   Closure Adhesive bandage 03/29/22 1500   Drainage Amount Scant 03/29/22 1500   Drainage Description Serosanguineous 03/29/22 1500   Treatments Cleansed;Site care;Offloading;Autolytic Debridement 03/29/22 1500   Wound Cleansing Approved Wound Cleanser 03/29/22 1500   Periwound Protectant Skin Protectant Wipes to Periwound 03/29/22 1500   Dressing Cleansing/Solutions Not Applicable 03/29/22 1500   Dressing Options Hydrocolloid Thin;Mepilex 03/29/22 1500   Dressing Changed New 03/29/22 1500   Dressing Status Clean;Dry;Intact 03/29/22 1500   Dressing Change/Treatment Frequency Every 72 hrs, and As Needed 03/29/22 1500   NEXT Dressing Change/Treatment Date 04/01/22 03/29/22 1500   NEXT Weekly Photo (Inpatient Only) 04/05/22 03/29/22 1500   Pressure Injury Stage U 03/29/22 1500   Non-staged Wound Description Not applicable 03/29/22 1500   Wound Length (cm) 9.5 cm 03/29/22 1500   Wound Width (cm) 3.5 cm 03/29/22 1500   Wound Surface Area (cm^2) 33.25 cm^2 03/29/22 1500   Shape Linear 03/29/22 1500   Wound Odor None 03/29/22 1500   Exposed Structures BRAYAN 03/29/22 1500   WOUND NURSE ONLY - Time Spent with Patient (mins) 60 03/29/22 1500       Wound 03/24/22 Pressure Injury Coccyx;Sacrum (POA) (Active)   Wound Image    03/29/22 1500   Site  Assessment Purple 03/29/22 1500   Periwound Assessment Clean;Dry;Intact 03/29/22 1500   Margins Attached edges;Defined edges 03/29/22 1500   Closure Open to air 03/29/22 1500   Drainage Amount None 03/29/22 1500   Treatments Cleansed;Site care 03/29/22 1500   Wound Cleansing Foam Cleanser/Washcloth 03/29/22 1500   Periwound Protectant Barrier Paste;Viscopaste 03/29/22 1500   Dressing Cleansing/Solutions Not Applicable 03/29/22 1500   Dressing Options Viscopaste 03/29/22 1500   Dressing Changed New 03/29/22 1500   Dressing Status Clean;Dry;Intact 03/29/22 1500   Dressing Change/Treatment Frequency Every Shift, and As Needed 03/29/22 1500   NEXT Dressing Change/Treatment Date 03/29/22 03/29/22 1500   NEXT Weekly Photo (Inpatient Only) 04/05/22 03/29/22 1500   Pressure Injury Stage DTPI 03/29/22 1500   Non-staged Wound Description Not applicable 03/29/22 1500   Wound Length (cm) 3.5 cm 03/29/22 1500   Wound Width (cm) 2.5 cm 03/29/22 1500   Wound Surface Area (cm^2) 8.75 cm^2 03/29/22 1500   Shape Round x2 03/29/22 1500   Wound Odor None 03/29/22 1500   Exposed Structures BRAYAN 03/29/22 1500       Wound 03/29/22 Full Thickness Wound Abdomen Right RLQ Puncture site (Active)   Site Assessment Clean;Dry;Intact 03/29/22 1600   Periwound Assessment Clean;Dry;Intact 03/29/22 1600   Margins Attached edges;Defined edges 03/29/22 1600   Closure Other (Comment) 03/29/22 1600   Drainage Amount Copious 03/29/22 1600   Drainage Description Serous 03/29/22 1600   Treatments Cleansed;Site care 03/29/22 1600   Wound Cleansing Foam Cleanser/Washcloth 03/29/22 1600   Periwound Protectant Skin Protectant Wipes to Periwound 03/29/22 1600   Dressing Cleansing/Solutions Not Applicable 03/29/22 1600   Dressing Changed New 03/29/22 1600   Dressing Status Clean;Dry;Intact 03/29/22 1600   Dressing Change/Treatment Frequency Daily, and As Needed 03/29/22 1600   NEXT Dressing Change/Treatment Date 03/30/22 03/29/22 1600   NEXT Weekly Photo  (Inpatient Only) 04/05/22 03/29/22 1600   Non-staged Wound Description Full thickness 03/29/22 1600   Wound Length (cm) 0.1 cm 03/29/22 1600   Wound Width (cm) 0.1 cm 03/29/22 1600   Wound Surface Area (cm^2) 0.01 cm^2 03/29/22 1600   Shape Round 03/29/22 1600   Exposed Structures BRAYAN 03/29/22 1600             Vascular:    CHAPO:   No results found.    Lab Values:    Lab Results   Component Value Date/Time    WBC 5.5 03/29/2022 05:20 AM    RBC 2.29 (L) 03/29/2022 05:20 AM    HEMOGLOBIN 7.6 (L) 03/29/2022 05:20 AM    HEMATOCRIT 23.7 (L) 03/29/2022 05:20 AM        Culture Results show:  No results found for this or any previous visit (from the past 720 hour(s)).    Pain Level/Medicated:  Pain with turning       INTERVENTIONS BY WOUND TEAM:  Chart and images reviewed. Discussed with bedside RN. All areas of concern (based on picture review, LDA review and discussion with bedside RN) have been thoroughly assessed. Documentation of areas based on significant findings. This RN in to assess patient. Performed standard wound care which includes appropriate positioning, dressing removal and non-selective debridement. Pictures and measurements obtained weekly if/when required.  Preparation for Dressing removal: NA   Non-selectively Debrided with:  NS and gauze.  Sharp debridement: NA  Shahla wound: Cleansed with moist warm washcloth, Prepped with no sting  Primary Dressing:   Back: Hydrocolloid thin  Sacrum: Barrier paste  RLQ Abdomen: Pediatric urostomy pouch with paste ring worm  Secondary (Outer) Dressing:   Back: Mepilex  Sacrum: Offloading with TAPs    Interdisciplinary consultation: Patient, Bedside RN (Trey), Wound RN (Kevin), Unit leadership (Gabby)    EVALUATION / RATIONALE FOR TREATMENT:  Most Recent Date:  3/29/2: Pt with POA pressure injuries related to being found down after falling out of bed. Pt incontinent of bowel and therefore barrier paste and offloading only appropriate treatment measure for sacrococcygeal  area. Back with wound with small amount of slough. Hydrocolloid applied to cleanse and autolytically debride. Mepilex applied to offload pressure.      Goals: Steady decrease in wound area and depth weekly.    WOUND TEAM PLAN OF CARE ([X] for frequency of wound follow up,):   Nursing to follow orders written for wound care. Contact wound team if area fails to progress, deteriorates or with any questions/concerns  Dressing changes by wound team:                   Follow up 3 times weekly:                NPWT change 3 times weekly:     Follow up 1-2 times weekly:      Follow up Bi-Monthly:                   Follow up as needed:   X  Other (explain):     NURSING PLAN OF CARE ORDERS (X):  Dressing changes: See Dressing Care orders: X  Skin care: See Skin Care orders: X  RN Prevention Protocol:   Rectal tube care: See Rectal Tube Care orders:   Other orders:    RSKIN:   CURRENTLY IN PLACE (X), APPLIED THIS VISIT (A), ORDERED (O):   Q shift Bob:  X  Q shift pressure point assessments:  X    Surface/Positioning   Pressure redistribution mattress    X        Low Airloss          Bariatric foam      Bariatric SHARAN     Waffle cushion        Waffle Overlay       X   Reposition q 2 hours    X  TAPs Turning system   X  Z Shaquille Pillow     Offloading/Redistribution   Sacral Mepilex (Silicone dressing)   X  Heel Mepilex (Silicone dressing)    O     Heel float boots (Prevalon boot)    O         Float Heels off Bed with Pillows           Respiratory   Silicone O2 tubing     X    Gray Foam Ear protectors   X  Cannula fixation Device (Tender )          High flow offloading Clip    Elastic head band offloading device      Anchorfast                                                         Trach with Optifoam split foam             Containment/Moisture Prevention     Rectal tube or BMS    Purwick/Condom Cath        Hanson Catheter  X  Barrier wipes       X    Barrier paste     X  Antifungal tx      Interdry        Mobilization BRAYAN       Up to chair        Ambulate      PT/OT      Nutrition       Dietician        Diabetes Education      PO     TF     TPN     NPO  X # days     Other        Anticipated discharge plans:   LTACH:        SNF/Rehab:        X, TBD          Home Health Care:           Outpatient Wound Center:            Self/Family Care:        Other:                  Vac Discharge Needs:   Not Applicable Pt not on a wound vac:     X  Regular Vac while inpatient, alternative dressing at DC:        Regular Vac in use and continued at DC:            Reg. Vac w/ Skin Sub/Biologic in use. Will need to be changed 2x wkly:      Veraflo Vac while inpatient, ok to transition to Regular Vac on Discharge:           Veraflo Vac while inpatient, will need to remain on Veraflo Vac upon discharge:

## 2022-03-29 NOTE — CARE PLAN
Problem: Fall Risk  Goal: Patient will remain free from falls  Outcome: Progressing  Note: Safety and fall precautions observed. Educated on the use of call light.     Problem: Pain - Standard  Goal: Alleviation of pain or a reduction in pain to the patient’s comfort goal  Outcome: Progressing  Note: Patient c/o severe pain at buttock, prn oxycodone given but no relief and also noted having difficulty swallowing, MD paged and received ordered for prn dilaudid, given with some relief.    The patient is Watcher - Medium risk of patient condition declining or worsening    Shift Goals  Clinical Goals: comfort; safety  Patient Goals: rest, comfort  Family Goals: Get better    Progress made toward(s) clinical / shift goals:      Patient is not progressing towards the following goals: BP noted low and patient npo, MD paged and made aware of the bp, iv fluid at 50 ml/hr started per order continuously, o2 at 2 liters/min placed as patient o2 sat drop after dilaudid to 85%. MD made aware. Hgb 7.6 this am, md made aware.

## 2022-03-29 NOTE — PROGRESS NOTES
Bedside report received.  Assessment complete.  A&O x 4. Patient calls appropriately.  Patient is not ambulatory, Q2 turns with one assist, TAPS system in use. Bed alarm on.   Patient has 0/10 pain. Pain managed with prescribed medications.  Denies N&V. NPO diet, aspiration risk.  Wound/skin care complete.  + void in marks, + flatus, + BM.  Patient denies SOB.  SCD's off.  Patient is fatigued, pleasant, and cooperative with the care plan.  Review plan with of care with patient. Call light and personal belongings within reach. Hourly rounding in place. All needs met at this time.  BP (!) 91/49   Pulse 85   Temp 37.3 °C (99.1 °F) (Temporal)   Resp 19   Ht 1.829 m (6')   Wt 86.2 kg (190 lb)   SpO2 95%   BMI 25.77 kg/m²

## 2022-03-30 NOTE — CARE PLAN
Problem: Knowledge Deficit - Standard  Goal: Patient and family/care givers will demonstrate understanding of plan of care, disease process/condition, diagnostic tests and medications  Outcome: Progressing     Problem: Fall Risk  Goal: Patient will remain free from falls  Outcome: Progressing       The patient is Stable - Low risk of patient condition declining or worsening    Shift Goals  Clinical Goals: safety & comfort  Patient Goals: rest  Family Goals: Get better    Progress made toward(s) clinical / shift goals: POC discussed with pt. Pt bed locked and in lowest position. Pt has call light within reach and calls appropriately. Pt has on nonslip socks.

## 2022-03-30 NOTE — THERAPY
Physical Therapy   Daily Treatment     Patient Name: Kris Ambriz  Age:  76 y.o., Sex:  male  Medical Record #: [id number]  Today's Date: 3/30/2022     Precautions  Precautions: Fall Risk;Swallow Precautions ( See Comments)  Comments: muliple skin abraisons    Assessment    Rec'd pt alert, in bed, lethargic, visiting w/ family.  Agreeable to work w/ PT.  He needs max assist to move to/from eob.  He is able to maintain eob balance w/o assist.  Max assist to stand several trials, w/ each needing max assist.  Able to take a few small side steps along the edge of the bed.  He fatigues rapidly.  Unable to sit in bed side chair today due to fatigue.  Progressing slowly.    Plan    Continue current treatment plan.    DC Equipment Recommendations: Unable to determine at this time  Discharge Recommendations: Recommend post-acute placement for additional physical therapy services prior to discharge home        Objective       03/30/22 1222   Balance   Sitting Balance (Static) Fair   Sitting Balance (Dynamic) Fair   Standing Balance (Static) Fair -   Standing Balance (Dynamic) Poor +   Weight Shift Sitting Poor   Weight Shift Standing Poor   Skilled Intervention Verbal Cuing;Tactile Cuing   Comments w/ HHA   Gait Analysis   Gait Level Of Assist Minimal Assist   Assistive Device Front Wheel Walker   Distance (Feet) 2   Deviation   (sidestepping left, flexed hips t/o)   Skilled Intervention Verbal Cuing;Tactile Cuing;Facilitation   Bed Mobility    Supine to Sit Maximal Assist   Sit to Supine Maximal Assist   Scooting Maximal Assist   Skilled Intervention Verbal Cuing;Tactile Cuing;Sequencing;Facilitation   Functional Mobility   Sit to Stand Maximal Assist   Bed, Chair, Wheelchair Transfer Unable to Participate   Skilled Intervention Facilitation;Verbal Cuing;Tactile Cuing   Short Term Goals    Short Term Goal # 1 Pt will perform bed mobility with HOB as needed with supervision in 6 visits   Goal Outcome # 1 goal not met   Short  Term Goal # 2 Pt will transfer with supervision in 6 visits to improve functional indep.   Goal Outcome # 2 Goal not met   Short Term Goal # 3 Pt will ambulate x 125 feet using FWW with supervision in 6 visits to improve functional indep.   Goal Outcome # 3 Goal not met   Short Term Goal # 4 Pt will negotiate one step with supervision in 6 visits to access home.   Goal Outcome # 4 Goal not met   Anticipated Discharge Equipment and Recommendations   DC Equipment Recommendations Unable to determine at this time   Discharge Recommendations Recommend post-acute placement for additional physical therapy services prior to discharge home

## 2022-03-30 NOTE — PROGRESS NOTES
Report received from NOC RN, assumed care at 0700.  Assessment complete.  A&O x 4. Delayed responses. Patient calls appropriately.  Bed alarm in place.   Patient has 6/10 pain. Pain managed with prescribed medications.  Denies N&V. Patient NPO with cortrak in left nare.  RLQ paracentesis site with peds urostomy appliance for drainage, CD&I.  + void via marks, + flatus, - BM.  Patient denies SOB.  Waffle mattress, heel float boots, wedges in place.  Patient calm, cooperative, fatigue.  Review plan with of care with patient. Call light and personal belongings within reach. Hourly rounding in place. All needs met at this time.

## 2022-03-30 NOTE — PROGRESS NOTES
Cortrak Placement    Tube Team verified patient name and medical record number prior to tube placement.  Cortrak tube (43 inches, 10 Bangladeshi) placed at 74 cm in left nare.  Per Cortrak picture, tube appears to be in the stomach.  Nursing Instructions: Awaiting KUB to confirm placement before use for medications or feeding. Once placement confirmed, flush tube with 30 ml of water, and then remove and save stylet, in patient medication drawer.

## 2022-03-30 NOTE — PROGRESS NOTES
Bedside report received.  Assessment complete.  A&O x 4. Patient calls appropriately.  Patient is not ambulatory. Q2 turns with 1-2 assist. Bed alarm on.   Patient has 7/10 pain. Medicated per MAR.   Skin per flow sheet.  Patient NPO r/t aspiration risk.  + void in marks, + flatus, + BM. Last BM 3/29.  SCD's off.  Reviewed plan of care with patient. Call light and personal belongings within reach. Hourly rounding in place. All needs met at this time.

## 2022-03-30 NOTE — CARE PLAN
Problem: Knowledge Deficit - Standard  Goal: Patient and family/care givers will demonstrate understanding of plan of care, disease process/condition, diagnostic tests and medications  Outcome: Progressing     Problem: Skin Integrity  Goal: Skin integrity is maintained or improved  Outcome: Progressing     Problem: Fall Risk  Goal: Patient will remain free from falls  Outcome: Progressing     Problem: Pain - Standard  Goal: Alleviation of pain or a reduction in pain to the patient’s comfort goal  Outcome: Progressing     The patient is Watcher - Medium risk of patient condition declining or worsening    Shift Goals  Clinical Goals: Pain control, skin integrity  Patient Goals: rest  Family Goals: Get better    Progress made toward(s) clinical / shift goals:  Pain meds administered PRN    Patient is not progressing towards the following goals:

## 2022-03-30 NOTE — THERAPY
"Occupational Therapy  Daily Treatment     Patient Name: Kris Ambriz  Age:  76 y.o., Sex:  male  Medical Record #: [id number]  Today's Date: 3/30/2022     Precautions  Precautions: Fall Risk,Swallow Precautions ( See Comments)  Comments: muliple skin abraisons  Assessment    Pt was seen for OT tx, pt was seen in collaboration w/PT d/t decline in strength and activity tolerance. Pt was withdrawn and flat through out session. Pt appears to have a decrease in activity tolerance and strength. Discussed w/pt that he needs to advocate for all staff to assist in OOB activity such as sitting EOB 2-3x/day in order to address his current debility. OT will continue to follow.     Plan  Continue current treatment plan.    DC Equipment Recommendations: Unable to determine at this time  Discharge Recommendations: Recommend post-acute placement for additional occupational therapy services prior to discharge home    Subjective  \"My shoulder hurts'      Objective       03/30/22 1235   Total Time Spent   Total Time Spent (Mins) 26   Treatment Charges   Charges Yes   OT Self Care / ADL 1   Precautions   Precautions Fall Risk;Swallow Precautions ( See Comments)   Comments muliple skin abraisons   Pain 0 - 10 Group   Location Shoulder;Leg   Location Orientation Right   Therapist Pain Assessment During Activity;Nurse Notified  (RN present for medication)   Cognition    Cognition / Consciousness X   Level of Consciousness Alert   Ability To Follow Commands 1 Step   Safety Awareness Impaired   New Learning Impaired   Attention Impaired   Comments more withdrawn than last session, providing mininmal verbal responses to questions slow to initiate   Passive ROM Upper Body   Passive ROM Upper Body WDL   Active ROM Upper Body   Active ROM Upper Body  X   Comments limited by weakness   Strength Upper Body   Upper Body Strength  X   Gross Strength Generalized Weakness, Equal Bilaterally.    Other Treatments   Other Treatments Provided Focused on " EOB oral and face grooming   Balance   Sitting Balance (Static) Fair -   Sitting Balance (Dynamic) Fair -   Standing Balance (Static) Fair -   Standing Balance (Dynamic) Poor +   Weight Shift Sitting Poor   Weight Shift Standing Poor   Skilled Intervention Verbal Cuing;Tactile Cuing;Postural Facilitation;Facilitation   Comments HHA   Bed Mobility    Supine to Sit Maximal Assist   Sit to Supine Maximal Assist   Scooting Maximal Assist   Rolling Maximal Assist to Rt.;Maximum Assist to Lt.   Skilled Intervention Verbal Cuing;Tactile Cuing   Activities of Daily Living   Grooming Moderate Assist;Seated   Upper Body Dressing Maximal Assist   Lower Body Dressing Maximal Assist   Toileting Total Assist   Skilled Intervention Verbal Cuing;Tactile Cuing;Facilitation   Comments completed oral care and face washing EOB, was incontinent of bowel in bed appears to have a functional decline from previous session   How much help from another person does the patient currently need...   6 Clicks Daily Activity Score 12   Functional Mobility   Sit to Stand Maximal Assist   Bed, Chair, Wheelchair Transfer Unable to Participate   Mobility EOB sit>stand BTB   Skilled Intervention Verbal Cuing;Postural Facilitation;Facilitation   Activity Tolerance   Comments c/o pain and fatigue   Patient / Family Goals   Patient / Family Goal #1 to go home   Goal #1 Outcome Goal not met   Short Term Goals   Short Term Goal # 1 pt will complete grooming standing at sink w/spv   Goal Outcome # 1 Goal not met   Short Term Goal # 2 pt will complete toilet txf w/spv   Goal Outcome # 2 Goal not met   Short Term Goal # 3 pt will complete LB dressing w/spv   Goal Outcome # 3 Goal not met   Education Group   Role of Occupational Therapist Patient Response Patient;Acceptance;Explanation

## 2022-03-30 NOTE — DISCHARGE PLANNING
"    Date: 3/30/3022        Anticipated Discharge Disposition:  DC to SNF with home care vs hospice care      Action:  CM met with team for mDR.  Patient had palliative care consult.  He is agreeable to ng tube feeds, for now.  MD plans to contact the patient's son, and discuss prognosis, and current plan of care.  Patient has been unable to eat, at home due to \"difficulty swallowing\".          Barriers to Discharge:     1) patient lives alone, palliative care completed GOC with patient, however, he was not clear about his wishes. Palliative care to follow up with son, as well.  2) patient has NO LTC-coverage  3) patient able to ambulate with minimal assist with FWW to sink; \"poor endurance\"  4) patient does NOT have Medicare part A, only Medicare part B with \"[employer]\" (public employment benefit program)      Plan: CM to follow up with PT/OT recommendations closer to dc.  CM e-mailed  to clarify what benefits patient may have with his primary coverage with [employer].      Addendum: SNF covered by [employer]      Trina Rivera RN, BSN, CM  Case Management  \"[facility]\"  E-mail: [email or web address]  Phone: [phone]    "

## 2022-03-30 NOTE — PROGRESS NOTES
[facility] MEDICINE PROGRESS NOTE     Attending:   Bonny Lunsford MD    Resident:   Nicole Aranda MD    PATIENT:   Kris Ambriz; [id number]; 1945    Kris Ambriz is a 76 y.o. male with hypertension, dyslipidemia, and GERD who presents after a ground-level fall at home. Pt has newly diagnosed cirrhosis of the liver and severe malnutrition.    SUBJECTIVE:   No acute events overnight, he has decided to go ahead with coretrak placement and tube feeding in hopes of regaining some strength. He has not made any headway in making a decision about home with comfort care, vs. Hospice vs full treatment. He would like to speak to hospice team to see what they would provide. VSS, no bleeding, no abdominal pain or fevers. Continues to have multiple BM per day    OBJECTIVE:  Vitals:    03/29/22 1534 03/29/22 1935 03/30/22 0258 03/30/22 0732   BP: (!) 98/51 (!) 98/60 104/61 101/59   Pulse: 87 85 83 80   Resp: 18 18 17 17   Temp: 37.7 °C (99.8 °F) 36.3 °C (97.4 °F) 37 °C (98.6 °F) 36.4 °C (97.6 °F)   TempSrc: Temporal Temporal Temporal Temporal   SpO2: 94% 96% 96% 98%   Weight:       Height:           Intake/Output Summary (Last 24 hours) at 3/30/2022 0822  Last data filed at 3/30/2022 0258  Gross per 24 hour   Intake 1213.34 ml   Output 1050 ml   Net 163.34 ml       PHYSICAL EXAM:   General: No acute distress, afebrile, resting comfortably, conversational, cachectic   HEENT: NC/AT. EOMI.   Cardiovascular: RRR without murmurs, rubs, heaves. Normal capillary refill   Respiratory: CTAB, no tachypnea or retractions   Abdomen: significant ascites unchanges. No erythema or drainage at paracentesis site. normal bowel sounds, soft, nontender, no masses, no organomegaly appreciated  EXT:  MAY, no edema  Skin: No erythema/ ecchymoses and skin breakdown over forearm  Neuro: Non-focal, alert and orientated       LABS:  Recent Labs     03/28/22  0352 03/29/22  0520 03/30/22  0235   WBC 5.9 5.5 4.9   RBC 2.39* 2.29* 2.29*   HEMOGLOBIN 7.9*  7.6* 7.7*   HEMATOCRIT 23.8* 23.7* 24.5*   MCV 99.6* 103.5* 107.0*   MCH 33.1* 33.2* 33.6*   RDW 52.7* 54.8* 56.0*   PLATELETCT 189 201 199   MPV 9.3 9.7 9.4   NEUTSPOLYS 70.80  --   --    LYMPHOCYTES 19.50*  --   --    MONOCYTES 9.00  --   --    EOSINOPHILS 0.30  --   --    BASOPHILS 0.20  --   --      Recent Labs     03/28/22  0352 03/29/22  0520 03/30/22  0235   SODIUM 139 142 140   POTASSIUM 3.7 3.8 3.5*   CHLORIDE 103 107 106   CO2 27 26 30   BUN 31* 29* 25*   CREATININE 0.81 0.81 0.71   CALCIUM 8.2* 8.2* 8.0*   MAGNESIUM  --   --  2.0   PHOSPHORUS  --   --  2.4*   ALBUMIN 2.2* 1.9* 1.9*     Estimated GFR/CRCL = Estimated Creatinine Clearance: 95.6 mL/min (by C-G formula based on SCr of 0.71 mg/dL).  Recent Labs     03/28/22  0352 03/29/22  0520 03/30/22  0235   GLUCOSE 97 80 116*     Recent Labs     03/28/22  0352 03/29/22  0520 03/30/22  0235   ASTSGOT 24 19 19   ALTSGPT 10 8 8   TBILIRUBIN 1.3 1.1 0.9   ALKPHOSPHAT 58 57 54   GLOBULIN 3.3 3.3 3.3             No results for input(s): INR, APTT, FIBRINOGEN in the last 72 hours.    Invalid input(s): DIMER      IMAGING:   DX-ABDOMEN FOR TUBE PLACEMENT   Final Result         1.  Nonspecific bowel gas pattern.   2.  Dobbhoff tube is coiled within the stomach, the tip terminates overlying the expected location of the gastric fundus.   3.  Pulmonary edema and/or infiltrates with probable component of bilateral pleural effusion.      CT-CHEST,ABDOMEN,PELVIS WITH   Final Result      1.  Cirrhosis and very large volume ascites, extending into a large hiatal hernia. In the chest, it exhibits mass effect on the heart.   2.  Large left and moderate right pleural effusions and associated atelectasis.            EC-ECHOCARDIOGRAM COMPLETE W/O CONT   Final Result      US-ABDOMEN COMPLETE SURVEY   Final Result         1.  Cirrhotic liver with marked amount of ascites throughout the abdomen and pelvis.      DX-CHEST-LIMITED (1 VIEW)   Final Result         Soft tissue  prominence seen along the right heart border could relate to dilated cardiac chamber. A mass cannot be excluded. Further evaluation with CT recommended.      CT-HEAD W/O   Final Result      1. No CT evidence of acute infarct, hemorrhage or mass.   2. Moderate global parenchymal atrophy. Chronic small vessel ischemic changes.          CULTURES:   Results     Procedure Component Value Units Date/Time    FLUID CULTURE W/GRAM STAIN [[id number]] Collected: 03/26/22 1725    Order Status: Completed Specimen: Body Fluid Updated: 03/29/22 0854     Significant Indicator NEG     Source BF     Site Paracentesis     Culture Result No growth at 72 hours.     Gram Stain Result No organisms seen.    GRAM STAIN [[id number]] Collected: 03/26/22 1725    Order Status: Completed Specimen: Body Fluid Updated: 03/27/22 1307     Significant Indicator .     Source BF     Site Paracentesis     Gram Stain Result No organisms seen.    FLUID CULTURE W/GRAM STAIN [[id number]] Collected: 03/26/22 1725    Order Status: Canceled Specimen: Other from [facility] Fluid           MEDS:  Current Facility-Administered Medications   Medication Last Admin   • D5 1/2 NS infusion New Bag at 03/29/22 1240   • Pharmacy Consult: Enteral tube insertion - review meds/change route/product selection     • cyanocobalamin (VITAMIN B-12) tablet 1,000 mcg 1,000 mcg at 03/30/22 0525   • folic acid (FOLVITE) tablet 1 mg 1 mg at 03/30/22 0525   • [Held by provider] metoprolol tartrate (LOPRESSOR) tablet 12.5 mg     • senna-docusate (PERICOLACE or SENOKOT S) 8.6-50 MG per tablet 2 Tablet      And   • polyethylene glycol/lytes (MIRALAX) PACKET 1 Packet      And   • magnesium hydroxide (MILK OF MAGNESIA) suspension 30 mL      And   • bisacodyl (DULCOLAX) suppository 10 mg     • simvastatin (ZOCOR) tablet 20 mg 20 mg at 03/29/22 3685   • [Held by provider] terazosin (HYTRIN) capsule 10 mg     • acetaminophen (Tylenol) tablet 650 mg     • oxyCODONE immediate-release (ROXICODONE)  tablet 5 mg 5 mg at 03/30/22 0100   • HYDROmorphone (Dilaudid) injection 0.5 mg 0.5 mg at 03/30/22 0209   • [Held by provider] spironolactone (ALDACTONE) tablet 50 mg 50 mg at 03/27/22 0605   • pantoprazole (Protonix) injection 20 mg 20 mg at 03/30/22 0525   • [Held by provider] aspirin-dipyridamole (AGGRENOX)  MG 1 Capsule 1 Capsule at 03/27/22 0628   • labetalol (NORMODYNE/TRANDATE) injection 10 mg         ASSESSMENT/PLAN: Kris Ambriz is a 76 y.o. male with hypertension, dyslipidemia, and GERD who presents after a ground-level fall at home. Pt has newly diagnosed cirrhosis of the liver and severe malnutrition    # Cirrhosis  # Goals of Care  Progressive debility, weakness, fatigue and poor PO intake leading up to this hospitalization. Cirrhosis diagnosed based on imaging (RUQ US and CT abdomen) as well as physical exam. He expresses desires to not undergo invasive procedures, MRI scans and has refused IV and feeding tube over the course of this hospitalization. His wishes are inconsistent and he will often opt for IV and feeding tube after time. Goals of care remain unclear.    Palliative consulted, appreciate recommendations and resources   Will place IP Hospice consultation    Holding spirinolactone, lasix, BB at this time due to hypotension   Continue protonix     # Aspiration  # FEN  # Protein Calorie malnutrition, severe  SLP consulted, recommending NPO based on failed swallow evaluations. Coretrak placed and dietary to make recommendations today for tube feeding. Patient looking forward to having more energy with feeds   - Tube feeds per dietary   - Monitor for refeeding syndrome with daily CMP, mag and phos    # Hypotension  Stable. Holding cirrhosis medications: spirinolactone, lasix, metoprolol. May consider re-adding back metoprolol. D51/2 NS while initiating feeds. Bolus okay if needed.     # Macrocytic anemia  Concern for GI bleed initially with history of cirrhosis with evidence of portal HTN  and melena at home prior to arrival. Hgb 12.2 with drop on day 1 of admission with drop to hgb 7-8. Stable since. .    - Suspect folate, B12 deficiency    - Folate and 100mg thiamine daily   - Protonix 20 BID    # Tachycardia  Resolved, continue home metoprolol      Core Measures:   Fluids: D5 1/2 NS 50 cc/hr  Lines: PIV  Abx: none  Diet: Regular, sodium restricted  PPX: protonix, SCD's  Code Status: DNR/DNI    Disposition: Inpatient for nutrition and concern for refeeding     Nicole Aranda MD   PGY-2 Family Medicine Resident   [facility]

## 2022-03-30 NOTE — DIETARY
"Nutrition Support Assessment:  Day 4 of admit.  Kris Ambriz is a 76 y.o. male with admitting DX of weakness, cirrhosis.     Current problem list:  1. Cirrhosis  2. Weakness     Assessment:  Estimated Nutritional Needs based on:   Height: 182.9 cm (6' 0.01\")  Weight: 86.2 kg (190 lb)  Weight to Use in Calculations: 86 kg (189 lb 9.5 oz) - most recent bed scale wt  Ideal Body Weight: 80.7 kg (178 lb)  Percent Ideal Body Weight: 106.7  Body mass index is 25.76 kg/m²., BMI classification: overeight    Calculation/Equation: MSJ x 1.2 = 1950 kcals/day  Total Calories / day: 1950 - 2150 (Calories / k - 25)  Total Grams Protein / day: 103 - 129 (Grams Protein / k.2 - 1.5) - higher protein provision given hx of cirrhosis     Evaluation:   1. Admitted with weakness, hx of ETOH use.  2. RD completed consult for wt loss per admit screen 3/26.  3. Previously on a 2 gram, high protein diet. Per ADL flow sheet, PO has been 25-75% of meals. Pt was also receiving oral nutrition supplements to optimize nutrition.  4. Swallow evaluation by SLP 3/28 and 3/29; recommended NPO with consideration for non-oral source of nutrition. Possible FEES f/u.  5. Cortrak placed and verified (gastric).  6. Generalized 3+ edema noted to right and left lower extremities; abdomen distended and round.  7. Potassium 3.5, Glucose 116, BUN 25, Albumin 1.9, Phos 2.4, Pre-Albumin <3.0, CRP 11.29  8. Vitamin B-12, Folvite, Pericolace on the MAR. Tube feeding at goal will provide 100% of the RDI for vitamins and minerals. Sent Voalte message to MD requesting 100 mg of Thiamine daily given hx of ETOH. Awaiting response.  9. Wound saw pt 3/27 for scattered abrasions. No further wound needs at this time.  10. Per visual assessment of pt, noted with severe fat loss evidenced by prominent zygomatic arches, prominent brow bone, and mostly skin in upper arm/tricep region; severe muscle loss evidenced by squared shoulders and prominent depression to dorsal " hand region.  11. Specialized low volume, high protein tube feeding formula indicated to best meet estimated nutrition needs in the setting of cirrhosis with edema/ascites.     Malnutrition Risk: Pt with severe, chronic malnutrition related to weakness and debility with frequent falls, likely 2' chronic ETOH use, AEB physical markers for severe fat and severe muscle loss as noted above, and generalized 3+ edema to right and left lower extremities.      Recommendations/Plan:  1. Start Impact Peptide 1.5 @ 25 mL/hr, advance per protocol to goal rate of 55 mL/hr. This provides 1980 kcals, 125 grams of protein, and 1016 mL of free water per day.    2. Fluids per MD.  3. PO diet when safe/appropriate per SLP/MD and pt can adequately consume.  4. Replete K, Phos and Mg prn. Supplement 100 mg Thiamine x 7 days to reduce risk of refeeding.    RD following.

## 2022-03-31 NOTE — PROGRESS NOTES
Bedside report received.  Assessment complete.  A&O x 4. Delayed responses. Patient calls appropriately.  Bed alarm on.   Patient has 5/10 pain. Declined intervention at this time.  RLQ paracentesis site with peds urostomy appliance for drainage, CDI.  Pt NPO. Cortrak to L nare running Impact Peptide @55mL/hr which is goal.  + void via marks, + flatus, + BM. Last BM 3/30.  Waffle mattress, TAPS system, q2 turns, and heel boots in place.  Reviewed plan of care with patient. Call light and personal belongings within reach. Hourly rounding in place. All needs met at this time.

## 2022-03-31 NOTE — CARE PLAN
Problem: Knowledge Deficit - Standard  Goal: Patient and family/care givers will demonstrate understanding of plan of care, disease process/condition, diagnostic tests and medications  Outcome: Progressing     Problem: Fall Risk  Goal: Patient will remain free from falls  Outcome: Progressing       The patient is Stable - Low risk of patient condition declining or worsening    Shift Goals  Clinical Goals: pain control, skin integrity  Patient Goals: rest  Family Goals: Get better    Progress made toward(s) clinical / shift goals: POC discussed with patient. Patient bed locked and in lowest position. Patient bed alarm on. Patient with call light within reach and calls appropriately.

## 2022-03-31 NOTE — PALLIATIVE CARE
Palliative Care follow-up  PC RN discussed pt with Dr. Aranda who kindly joined PC RN at bedside to discuss goals of care. BS RN reported that pt is more encephalopathic today and pt appears fatigued and uncomfortable during visit. PC RN suggested meeting tomorrow at bedside with PC RN and family who is able to attend as pt is more awake in early afternoon. Pt and Ilene agreeable. Plan for 12pm meeting. Pt would like for his son Joaquin to join, Ilene will reach out to notify. PC RN left voicemail with Joaquin as well.       Updated: BS SAMMY Lewis and MD    Plan: 12pm [facility] meeting with PC RN, sister Ilene, niece Bonny, and possibly pt's son if he is able to attend. Awaiting call back from son.     Thank you for allowing Palliative Care to support this patient and family. Contact [phone] for additional assistance, change in patient status, or with any questions/concerns.

## 2022-03-31 NOTE — CARE PLAN
Problem: Knowledge Deficit - Standard  Goal: Patient and family/care givers will demonstrate understanding of plan of care, disease process/condition, diagnostic tests and medications  Outcome: Not Progressing  Note: Pt having intermittent bouts of confusion     Problem: Skin Integrity  Goal: Skin integrity is maintained or improved  Outcome: Progressing  Note: Providing q2 turns and dressing care as needed    The patient is Stable - Low risk of patient condition declining or worsening    Shift Goals  Clinical Goals: pain control, dressing care, feeds  Patient Goals: rest  Family Goals: Get better    Progress made toward(s) clinical / shift goals:  medicating for pain. Monitoring pain medication needs as pt is having confusion.     Patient is not progressing towards the following goals:      Problem: Knowledge Deficit - Standard  Goal: Patient and family/care givers will demonstrate understanding of plan of care, disease process/condition, diagnostic tests and medications  Outcome: Not Progressing  Note: Pt having intermittent bouts of confusion

## 2022-03-31 NOTE — PROGRESS NOTES
Report received from RN, assumed care at 0645  Pt is A0X4, intermittently confused  Pt declines any SOB, chest pain, new onset of numbness/ tingiling  Pt rates pain at 7/10, on a scale of 1-10, pt medicated per MAR.   Pt has marks in place for skin care  Pt has + flatus, + bowel sounds, + BM on 3/30  Pt is not ambulating d/t weakness  Pt has a coretrak in place running impact peptide 1.5 at 55mL/hr. Ice chips provided intermittently per SLP order.     Peds ostomy to RLQ from paracentesis site leaking however  Pt removed this dressing along with other preventative dressings.   Plan of care discussed, all questions answered. Explained importance of calling before getting OOB and pt verbalizes understanding. Explained importance of oral care. Call light is within reach, treaded slipper socks on, bed in lowest/ locked position, hourly rounding in place, all needs met at this time. Bed alarm in place

## 2022-03-31 NOTE — PROGRESS NOTES
[facility] [place]     Attending:   Bonny Lunsford MD    Resident:   Nicole Aranda MD    PATIENT:   Kris Ambriz; [id number]; 1945    Kris Ambriz is a 76 y.o. male with hypertension, dyslipidemia, and GERD who presents after a ground-level fall at home. Pt has newly diagnosed cirrhosis of the liver and severe malnutrition.    SUBJECTIVE:   No acute events overnight, vitals have remained stable, tube feeds were started yesterday per dietary recommendations. He reports significant pain over sacrum this morning, requiring     OBJECTIVE:  Vitals:    03/30/22 0732 03/30/22 1540 03/30/22 2023 03/31/22 0334   BP: 101/59 113/68 107/64 (!) 90/58   Pulse: 80 70 65 69   Resp: 17 16 17 17   Temp: 36.4 °C (97.6 °F) 36.7 °C (98 °F) 36.3 °C (97.3 °F) 36.7 °C (98 °F)   TempSrc: Temporal Temporal Temporal Temporal   SpO2: 98% 98% 97% 97%   Weight:       Height:           Intake/Output Summary (Last 24 hours) at 3/31/2022 0549  Last data filed at 3/31/2022 0334  Gross per 24 hour   Intake 1450 ml   Output 1275 ml   Net 175 ml       PHYSICAL EXAM:   General: No acute distress, afebrile,  cachectic  HEENT: NC/AT. EOMI. NC in place  Cardiovascular: RRR without murmurs, rubs, heaves. Normal capillary refill   Respiratory: CTAB, no tachypnea or retractions   Abdomen: soft, nontender, distended with significant ascites  EXT:  MAY, 3+ pitting edema to the knee bilaterally  Skin: No erythema, ecchymoses and skin tears over L face and forearm healing  Neuro: Non-focal, alert and orientated       LABS:  Recent Labs     03/29/22  0520 03/30/22 0235 03/31/22  0338   WBC 5.5 4.9 5.6   RBC 2.29* 2.29* 2.58*   HEMOGLOBIN 7.6* 7.7* 8.6*   HEMATOCRIT 23.7* 24.5* 27.0*   .5* 107.0* 104.7*   MCH 33.2* 33.6* 33.3*   RDW 54.8* 56.0* 52.8*   PLATELETCT 201 199 224   MPV 9.7 9.4 9.5     Recent Labs     03/29/22  0520 03/30/22  0235 03/31/22  0338   SODIUM 142 140 140   POTASSIUM 3.8 3.5* 3.3*   CHLORIDE 107 106 105   CO2 26 30  30   BUN 29* 25* 21   CREATININE 0.81 0.71 0.71   CALCIUM 8.2* 8.0* 8.3*   MAGNESIUM  --  2.0 1.9   PHOSPHORUS  --  2.4* 2.1*   ALBUMIN 1.9* 1.9* 2.1*     Estimated GFR/CRCL = Estimated Creatinine Clearance: 95.6 mL/min (by C-G formula based on SCr of 0.71 mg/dL).  Recent Labs     03/29/22  0520 03/30/22  0235 03/31/22  0338   GLUCOSE 80 116* 153*     Recent Labs     03/29/22  0520 03/30/22  0235 03/31/22  0338   ASTSGOT 19 19 18   ALTSGPT 8 8 9   TBILIRUBIN 1.1 0.9 0.7   ALKPHOSPHAT 57 54 74   GLOBULIN 3.3 3.3 3.5             No results for input(s): INR, APTT, FIBRINOGEN in the last 72 hours.    Invalid input(s): DIMER    IMAGING:   DX-ABDOMEN FOR TUBE PLACEMENT   Final Result         1.  Nonspecific bowel gas pattern.   2.  Dobbhoff tube is coiled within the stomach, the tip terminates overlying the expected location of the gastric fundus.   3.  Pulmonary edema and/or infiltrates with probable component of bilateral pleural effusion.      CT-CHEST,ABDOMEN,PELVIS WITH   Final Result      1.  Cirrhosis and very large volume ascites, extending into a large hiatal hernia. In the chest, it exhibits mass effect on the heart.   2.  Large left and moderate right pleural effusions and associated atelectasis.            EC-ECHOCARDIOGRAM COMPLETE W/O CONT   Final Result      US-ABDOMEN COMPLETE SURVEY   Final Result         1.  Cirrhotic liver with marked amount of ascites throughout the abdomen and pelvis.      DX-CHEST-LIMITED (1 VIEW)   Final Result         Soft tissue prominence seen along the right heart border could relate to dilated cardiac chamber. A mass cannot be excluded. Further evaluation with CT recommended.      CT-HEAD W/O   Final Result      1. No CT evidence of acute infarct, hemorrhage or mass.   2. Moderate global parenchymal atrophy. Chronic small vessel ischemic changes.          CULTURES:   Results     Procedure Component Value Units Date/Time    FLUID CULTURE W/GRAM STAIN [[id number]] Collected:  03/26/22 1725    Order Status: Completed Specimen: Body Fluid Updated: 03/29/22 0854     Significant Indicator NEG     Source BF     Site Paracentesis     Culture Result No growth at 72 hours.     Gram Stain Result No organisms seen.    GRAM STAIN [[id number]] Collected: 03/26/22 1725    Order Status: Completed Specimen: Body Fluid Updated: 03/27/22 1307     Significant Indicator .     Source BF     Site Paracentesis     Gram Stain Result No organisms seen.    FLUID CULTURE W/GRAM STAIN [[id number]] Collected: 03/26/22 1725    Order Status: Canceled Specimen: Other from [facility] Fluid           MEDS:  Current Facility-Administered Medications   Medication Last Admin   • thiamine (Vitamin B-1) tablet 100 mg 100 mg at 03/31/22 0510   • omeprazole (FIRST-OMEPRAZOLE) 2 mg/mL oral susp 40 mg 40 mg at 03/31/22 0509   • D5 1/2 NS infusion New Bag at 03/30/22 0858   • Pharmacy Consult: Enteral tube insertion - review meds/change route/product selection     • cyanocobalamin (VITAMIN B-12) tablet 1,000 mcg 1,000 mcg at 03/31/22 0510   • folic acid (FOLVITE) tablet 1 mg 1 mg at 03/31/22 0509   • [Held by provider] metoprolol tartrate (LOPRESSOR) tablet 12.5 mg     • senna-docusate (PERICOLACE or SENOKOT S) 8.6-50 MG per tablet 2 Tablet      And   • polyethylene glycol/lytes (MIRALAX) PACKET 1 Packet      And   • magnesium hydroxide (MILK OF MAGNESIA) suspension 30 mL      And   • bisacodyl (DULCOLAX) suppository 10 mg     • simvastatin (ZOCOR) tablet 20 mg 20 mg at 03/30/22 2134   • [Held by provider] terazosin (HYTRIN) capsule 10 mg     • acetaminophen (Tylenol) tablet 650 mg     • oxyCODONE immediate-release (ROXICODONE) tablet 5 mg 5 mg at 03/31/22 0509   • HYDROmorphone (Dilaudid) injection 0.5 mg 0.5 mg at 03/30/22 1211   • [Held by provider] spironolactone (ALDACTONE) tablet 50 mg 50 mg at 03/27/22 0605   • [Held by provider] aspirin-dipyridamole (AGGRENOX)  MG 1 Capsule 1 Capsule at 03/27/22 0659   • labetalol  (NORMODYNE/TRANDATE) injection 10 mg         ASSESSMENT/PLAN: Kris Ambriz is a 76 y.o. male with hypertension, dyslipidemia, and GERD who presents after a ground-level fall at home. Pt has newly diagnosed cirrhosis of the liver and severe malnutrition     # Cirrhosis  # Goals of Care  Progressive debility, weakness, fatigue and poor PO intake leading up to this hospitalization. Cirrhosis diagnosed based on imaging (RUQ US and CT abdomen) as well as physical exam. He expresses desires to not undergo invasive procedures, MRI scans and has refused IV and feeding tube over the course of this hospitalization. His wishes are inconsistent and he will often opt for IV and feeding tube after time. Goals of care remain unclear.               Palliative consulted, appreciate recommendations and resources              Awaiting IP Hospice consultation               Holding spirinolactone, lasix, BB at this time due to hypotension              Continue protonix    Plan for family discussion today vs. tomorrow     # Aspiration  # FEN  # Protein Calorie malnutrition, severe  SLP consulted, recommending NPO based on failed swallow evaluations. Coretrak placed and dietary to make recommendations today for tube feeding. Patient looking forward to having more energy with feeds              - Tube feeds per dietary: Impact Peptide 1.5 @ 25 mL/hr, advance per protocol to goal rate of 55 mL/hr              - Monitor for refeeding syndrome with daily CMP, mag and phos     # Hypotension  Stable. Holding cirrhosis medications: spirinolactone, lasix, metoprolol. May consider re-adding back metoprolol. D51/2 NS while initiating feeds. Bolus okay if needed.      # Macrocytic anemia  Concern for GI bleed initially with history of cirrhosis with evidence of portal HTN and melena at home prior to arrival. Hgb 12.2 with drop on day 1 of admission with drop to hgb 7-8. Stable since. .               - Suspect folate, B12 deficiency                - Folate and 100mg thiamine daily              - Protonix 20 BID    # Pressure wound  9.5 x 3.5 cm evaluated by wound care, recommending mepilex and hydrocolloid, follow up as needed   Continue barrier dressings, repositioning, and pain control with oxy and dilaudid     # Tachycardia  Resolved, continue home metoprolol        Core Measures:   Fluids: D5 1/2 NS 50 cc/hr  Lines: PIV  Abx: none  Diet: Regular, sodium restricted  PPX: protonix, SCD's  Code Status: DNR/DNI     Disposition: Inpatient for nutrition and concern for refeeding     Nicole Aranda MD   PGY-2 Family Medicine Resident   [facility]

## 2022-03-31 NOTE — PROGRESS NOTES
2 RN skin check complete by SAMMY Ramirez and Debbie CHRISTIANSON    Devices in place: PIV line, silicone NC, kendrick [place]le (statlock replaced).   Skin assessed under devices     Confirmed pressure ulcers found on: NA  New potential ulcers noted on: NA  Wound consult placed: in place     -Skin beneath folds checked.   -R foot, scabbing, heel blanching  -L foot blanching  -Bilateral knee scabbing.  -BLE edema, L greater than R  -BUE scattered abrasions/scabbing  -Redness to abdomen.   -DTI to sacrum, barrier paste applied  -R flank paracentesis site, healing       Preventative measures in place:  Mepilex placed to bilateral heels, bilateral elbows.   - turns with use of pillows to support repositioning  -heels floated on pillows    -bony prominences floated on pillows  -repositioning of devices   -oral care q2 hours  -Waffle Mattress overlay in place  -Z-flow pillow in place.   -TAPS system in place to assist with turning while in bed

## 2022-04-01 NOTE — CARE PLAN
Problem: Skin Integrity  Goal: Skin integrity is maintained or improved  Outcome: Not Progressing     Problem: Fall Risk  Goal: Patient will remain free from falls  Outcome: Progressing     The patient is Watcher - Medium risk of patient condition declining or worsening    Shift Goals  Clinical Goals: saftey, fall prevention, rest, skin integrity  Patient Goals: rest  Family Goals: no family present    Progress made toward(s) clinical / shift goals: Patient sated he was not in pain but wanted to get up. Patient went EOB with RN and 2 CNAs. Patient tired quickly and was not able to sit up without being supported.     Patient is not progressing towards the following goals: Patient confused and restless early in the night. Patient removed cortrack. [facility] family notified and put in a telephone order with readback for a bridle. New cortrack inserted and confirmed placement via xray. Patient educated on bridle and use of cortrack and to call for assistance.  Patient has thick secretions and is coughing frequently and utilizing mouth suction to manage secretions.

## 2022-04-01 NOTE — PROGRESS NOTES
[facility] MEDICINE PROGRESS NOTE     Attending: Bonny Lunsford MD     Resident: Cayetano Manuel MD     PATIENT: Kris Ambriz; [id number]; 1945     ID: Kris Ambriz is a 76 y.o. male with hypertension, dyslipidemia, and GERD who presents after a ground-level fall at home. Pt has newly diagnosed cirrhosis of the liver.    SUBJECTIVE: No acute events overnight. Pt requires 3L O2 and BP dropped to 90's/50's overnight but recovered to 100's/60's this morning.    OBJECTIVE:  Temp:  [36.4 °C (97.5 °F)-37.4 °C (99.3 °F)] 36.4 °C (97.5 °F)  Pulse:  [78-90] 90  Resp:  [16-17] 17  BP: ()/(58-86) 95/58  SpO2:  [90 %-96 %] 96 %      Intake/Output Summary (Last 24 hours) at 4/1/2022 0531  Last data filed at 4/1/2022 0156  Gross per 24 hour   Intake 1754.9 ml   Output 700 ml   Net 1054.9 ml       PE:  General: No acute distress, afebrile,  cachectic.   HEENT: NC/AT. EOMI. NC in place  Cardiovascular: RRR without murmurs, rubs, heaves. Normal capillary refill   Respiratory: CTAB, no tachypnea or retractions   Abdomen: soft, nontender, distended with significant ascites  EXT:  MAY, 3+ pitting edema to the knee bilaterally  Skin: No erythema, ecchymoses and skin tears over L face and forearm healing  Neuro: Non-focal, A&Ox4. Slightly improved ability to answer questions compared to two days ago.    LABS:  Recent Labs     03/30/22  0235 03/31/22  0338 04/01/22  0157   WBC 4.9 5.6 6.0   RBC 2.29* 2.58* 2.58*   HEMOGLOBIN 7.7* 8.6* 8.6*   HEMATOCRIT 24.5* 27.0* 26.4*   .0* 104.7* 102.3*   MCH 33.6* 33.3* 33.3*   RDW 56.0* 52.8* 52.9*   PLATELETCT 199 224 219   MPV 9.4 9.5 9.6     Recent Labs     03/30/22  0235 03/31/22  0338 04/01/22  0157   SODIUM 140 140 140   POTASSIUM 3.5* 3.3* 3.9   CHLORIDE 106 105 106   CO2 30 30 28   BUN 25* 21 20   CREATININE 0.71 0.71 0.58   CALCIUM 8.0* 8.3* 7.7*   MAGNESIUM 2.0 1.9 1.9   PHOSPHORUS 2.4* 2.1* 2.1*   ALBUMIN 1.9* 2.1* 2.2*     Estimated GFR/CRCL = Estimated Creatinine Clearance:  117.1 mL/min (by C-G formula based on SCr of 0.58 mg/dL).  Recent Labs     03/30/22  0235 03/31/22 0338 04/01/22  0157   GLUCOSE 116* 153* 152*     Recent Labs     03/30/22  0235 03/31/22 0338 04/01/22  0157   ASTSGOT 19 18 21   ALTSGPT 8 9 8   TBILIRUBIN 0.9 0.7 0.6   ALKPHOSPHAT 54 74 85   GLOBULIN 3.3 3.5 3.5             No results for input(s): INR, APTT, FIBRINOGEN in the last 72 hours.    Invalid input(s): DIMER    MICROBIOLOGY:  Results     Procedure Component Value Units Date/Time    FLUID CULTURE W/GRAM STAIN [[id number]] Collected: 03/26/22 1725    Order Status: Completed Specimen: Body Fluid Updated: 03/29/22 0854     Significant Indicator NEG     Source BF     Site Paracentesis     Culture Result No growth at 72 hours.     Gram Stain Result No organisms seen.    GRAM STAIN [[id number]] Collected: 03/26/22 1725    Order Status: Completed Specimen: Body Fluid Updated: 03/27/22 1307     Significant Indicator .     Source BF     Site Paracentesis     Gram Stain Result No organisms seen.    FLUID CULTURE W/GRAM STAIN [[id number]] Collected: 03/26/22 1725    Order Status: Canceled Specimen: Other from [facility] Fluid           IMAGING:  DX-ABDOMEN FOR TUBE PLACEMENT   Final Result         1.  Nonspecific bowel gas pattern.   2.  Dobbhoff tube tip terminates overlying the expected location of the gastric antrum or pylorus.   3.  Pulmonary infiltrates and/or edema.   4.  Layering bilateral pleural effusions      DX-ABDOMEN FOR TUBE PLACEMENT   Final Result         1.  Nonspecific bowel gas pattern.   2.  Dobbhoff tube is coiled within the stomach, the tip terminates overlying the expected location of the gastric fundus.   3.  Pulmonary edema and/or infiltrates with probable component of bilateral pleural effusion.      CT-CHEST,ABDOMEN,PELVIS WITH   Final Result      1.  Cirrhosis and very large volume ascites, extending into a large hiatal hernia. In the chest, it exhibits mass effect on the heart.   2.  Large  left and moderate right pleural effusions and associated atelectasis.            EC-ECHOCARDIOGRAM COMPLETE W/O CONT   Final Result      US-ABDOMEN COMPLETE SURVEY   Final Result         1.  Cirrhotic liver with marked amount of ascites throughout the abdomen and pelvis.      DX-CHEST-LIMITED (1 VIEW)   Final Result         Soft tissue prominence seen along the right heart border could relate to dilated cardiac chamber. A mass cannot be excluded. Further evaluation with CT recommended.      CT-HEAD W/O   Final Result      1. No CT evidence of acute infarct, hemorrhage or mass.   2. Moderate global parenchymal atrophy. Chronic small vessel ischemic changes.          MEDS:  Current Facility-Administered Medications   Medication Last Admin   • potassium chloride SA (Kdur) tablet 40 mEq 40 mEq at 03/31/22 0608   • HYDROmorphone (Dilaudid) injection 0.5 mg 0.5 mg at 03/31/22 0737   • atropine 1 % ophthalmic solution 1 Drop     • thiamine (Vitamin B-1) tablet 100 mg 100 mg at 03/31/22 0510   • omeprazole (FIRST-OMEPRAZOLE) 2 mg/mL oral susp 40 mg 40 mg at 03/31/22 1711   • D5 1/2 NS infusion New Bag at 03/30/22 0858   • Pharmacy Consult: Enteral tube insertion - review meds/change route/product selection     • cyanocobalamin (VITAMIN B-12) tablet 1,000 mcg 1,000 mcg at 03/31/22 0510   • folic acid (FOLVITE) tablet 1 mg 1 mg at 03/31/22 0509   • [Held by provider] metoprolol tartrate (LOPRESSOR) tablet 12.5 mg     • senna-docusate (PERICOLACE or SENOKOT S) 8.6-50 MG per tablet 2 Tablet      And   • polyethylene glycol/lytes (MIRALAX) PACKET 1 Packet      And   • magnesium hydroxide (MILK OF MAGNESIA) suspension 30 mL      And   • bisacodyl (DULCOLAX) suppository 10 mg     • simvastatin (ZOCOR) tablet 20 mg 20 mg at 04/01/22 0006   • [Held by provider] terazosin (HYTRIN) capsule 10 mg     • acetaminophen (Tylenol) tablet 650 mg     • oxyCODONE immediate-release (ROXICODONE) tablet 5 mg 5 mg at 03/31/22 1711   • [Held by  provider] spironolactone (ALDACTONE) tablet 50 mg 50 mg at 03/27/22 0605   • [Held by provider] aspirin-dipyridamole (AGGRENOX)  MG 1 Capsule 1 Capsule at 03/27/22 0628   • labetalol (NORMODYNE/TRANDATE) injection 10 mg         PROBLEM LIST:  Problem Noted   Cirrhosis (Hcc) 3/26/2022       ASSESSMENT/PLAN: Kris Ambriz is a 76 y.o. male with hypertension, dyslipidemia, and GERD who presents after a ground-level fall at home. Pt has newly diagnosed cirrhosis of the liver and severe malnutrition     # Cirrhosis  # Goals of Care  Progressive debility, weakness, fatigue and poor PO intake leading up to this hospitalization. Cirrhosis diagnosed based on imaging (RUQ US and CT abdomen) as well as physical exam. He expresses desires to not undergo invasive procedures, MRI scans and has refused IV and feeding tube over the course of this hospitalization. His wishes are inconsistent and he will often opt for IV and feeding tube after time. Goals of care remain unclear.   - Palliative consulted, appreciate recommendations and resources   - Plan for family discussion today with palliative  - Holding spirinolactone, lasix, BB due to hypotension but patient's BP improving gradually   - Consider re-starting spironolactone and lasix for diuresis  - Continue protonix      # Aspiration  # FEN  # Protein Calorie malnutrition, severe  SLP consulted, recommending NPO based on failed swallow evaluations. Coretrak placed and dietary to make recommendations today for tube feeding. Patient looking forward to having more energy with feeds  - Tube feeds per dietary: Impact Peptide 1.5 @ 25 mL/hr, advance per protocol to goal rate of 55 mL/hr  - Monitor for refeeding syndrome with daily CMP, mag and phos     # Hypotension  Stable. Holding cirrhosis medications: spirinolactone, lasix, metoprolol. May consider re-adding back metoprolol.  - Bolus fluids okay if needed for hypotension  - Stopping IVF as pt now on NG tube feeds     #  Macrocytic anemia  Concern for GI bleed initially with history of cirrhosis with evidence of portal HTN and melena at home prior to arrival. Hgb 12.2 with drop on day 1 of admission with drop to hgb 7-8. Stable since. .   - Suspect folate, B12 deficiency   - Folate and 100mg thiamine daily  - Protonix 20 BID     # Pressure wound  9.5 x 3.5 cm evaluated by wound care, recommending mepilex and hydrocolloid, follow up as needed  - Continue barrier dressings, repositioning, and pain control with oxy and dilaudid     # Tachycardia  Resolved  - Restart home metoprolol when BP stabalizes     Core Measures:   Fluids: none, discontinued  Lines: PIV  Abx: none  Diet: Strict NPO due to risk of aspiration, all feeds through NGT  PPX: protonix, SCD's  Code Status: DNR/DNI     Disposition: Inpatient for nutrition and concern for refeeding syndrome     Cayetano Manuel MD  PGY1  [facility]

## 2022-04-01 NOTE — PROGRESS NOTES
Cortrak Placement    Tube Team verified patient name and medical record number prior to tube placement.  Cortrak tube (43 inches, 10 [place]) placed at 74 cm in left nare.  Per Cortrak picture, tube appears to be in the stomach.  Nursing Instructions: Awaiting KUB to confirm placement before use for medications or feeding. Once placement confirmed, flush tube with 30 ml of water, and then remove and save stylet, in patient medication drawer.

## 2022-04-01 NOTE — DISCHARGE PLANNING
"    Date:  4/1/2022      Anticipated Discharge Disposition:  DC home with hospice vs hospice at a facility      Action: .CM met with team for MDR.  Palliative care has met with patient and family, at bedside.  Comfort care agreed upon.  [facility] to meet with family and discuss hospice care options.          Barriers to Discharge:      1) Medicare/[employer] do NOT cover hospice in a facility, SNF.  Family would have to pay for room and board at a SNF, and then a hospice agency would provide hospice care.  2) patient had been living alone PTA.      Plan:  CM to follow up with hospice and for dc plan.            Trina Rivera RN, BSN, CM  Case Management  \"[facility] [employer]\"  E-mail: [email or web address]  Phone: [phone]    "

## 2022-04-01 NOTE — DISCHARGE PLANNING
Received Choice form at [id number]  Agency/Facility Name: [facility]   Referral sent per Choice form @ [phone]

## 2022-04-01 NOTE — THERAPY
"Missed Therapy     Patient Name: Kris Ambriz  Age:  76 y.o., Sex:  male  Medical Record #: [id number]  Today's Date: 4/1/2022    Discussed missed therapy with Nursing.        04/01/22 1453   Treatment Variance   Reason For Missed Therapy Medical - Patient on Hold from Therapy   Total Time Spent   Total Time Spent (Mins) 3   Interdisciplinary Plan of Care Collaboration   IDT Collaboration with  Nursing;Physician   Collaboration Comments Chart reviewed and observed mention of comfort care after PC meeting. Discussed with RN who confirmed transition to comfort care today. Diet has been entered to \"advance as tolerated\" appropriately though RN informs SLP that pt has desire to continue TF, not eat or drink. Given transitional care and patient wishes, additional ST intervention does not appear indicated. ST will hold, consider discontinuing order. Should changes in goals of care arise, please notify ST and place new orders. Thank you.   Session Information   Date / Session Number 4/1/22: note only: 2 (2/4-4/3)     "

## 2022-04-01 NOTE — CARE PLAN
The patient is Stable - Low risk of patient condition declining or worsening    Shift Goals  Clinical Goals: safety, rest, skin integrity  Patient Goals: rest  Family Goals: no family present    Progress made toward(s) clinical / shift goals:    Problem: Fall Risk  Goal: Patient will remain free from falls  Outcome: Progressing  Note: Patient actively using call button for any needs. Patient is not attempting to get out of bed by self.      Problem: Pain - Standard  Goal: Alleviation of pain or a reduction in pain to the patient’s comfort goal  Outcome: Progressing  Note: Patient has changed to comfort care measures. Will medicate as needed.

## 2022-04-02 NOTE — PROGRESS NOTES
Report received from RN, assumed care at 0645  Pt is A0X4, however frequently becomes confused and slow to respond  Pt aspirating on PO intake and is having heavy mucous production, difficulty maintaining secretions.   Pt rates pain at 8/10, on a scale of 1-10, pt medicated per MAR. Transitioned to comfort care and medicated with nonverbal descriptors   Pt has marks in place  Pt last BM 4/1  Pt non ambulatory  Attempted to assess sacral wounds however this was causing pt significant distress.   Pt continuing tube feed despite comfort care status. Decreased rate to 20 in an attempt to improve secretions.   Plan of care discussed, all questions answered. Explained importance of calling before getting OOB and pt verbalizes understanding. Explained importance of oral care. Call light is within reach, treaded slipper socks on, bed in lowest/ locked position, hourly rounding in place, all needs met at this time Bed alarm on.

## 2022-04-02 NOTE — CARE PLAN
Problem: Knowledge Deficit - Standard  Goal: Patient and family/care givers will demonstrate understanding of plan of care, disease process/condition, diagnostic tests and medications  Outcome: Progressing     Problem: Pain - Standard  Goal: Alleviation of pain or a reduction in pain to the patient’s comfort goal  Outcome: Progressing   The patient is Stable - Low risk of patient condition declining or worsening    Shift Goals  Clinical Goals: comfort  Patient Goals: comfort  Family Goals: comfort    Progress made toward(s) clinical / shift goals:  Pt comfort care, actively dying. Pt stable in the sense that he is being medicated frequently for comfort    Report received from RN, assumed care at 0645  Pt is sedated and not responsive, showing minimal signs of discomfort after receiving pain medications  Renea london breathing   Medicated per non verbal scale.   Hanson in place with minimal output   Pt has + flatus, + bowel sounds, + BM on  Family at bedside. This RN frequently rounding to answer questions and provide emotional support.

## 2022-04-02 NOTE — CARE PLAN
Problem: Fall Risk  Goal: Patient will remain free from falls  Outcome: Progressing     Problem: Pain - Standard  Goal: Alleviation of pain or a reduction in pain to the patient’s comfort goal  Outcome: Progressing     The patient is Watcher - Medium risk of patient condition declining or worsening    Comfort Care Patient     Shift Goals  Clinical Goals: safety, pain managment, skin integrity, comfort  Patient Goals: rest  Family Goals: no family present at this time    Progress made toward(s) clinical / shift goals:  Patient still on tube feed per patient request. Patient seen grimacing, and wincing in pain. PRN pain medication was administered. Patient seen resting. Will continue to medicate per PRN pain medications when necessary for relief and comfort.     Patient is not progressing towards the following goals:

## 2022-04-02 NOTE — PROGRESS NOTES
[facility] MEDICINE PROGRESS NOTE     Attending:   Shane Serrano MD    Resident:   Nicole Aranda MD    PATIENT:   Kris Ambriz; [id number]; 1945    SUBJECTIVE:   Met with palliative care yesterday, patient has opted for comfort care. This morning his NGT is no longer in place. He is resting and awakens to voice. He reports no concerns and feels that he doesn't need anything this morning.     OBJECTIVE:  Vitals:    03/31/22 1912 04/01/22 0500 04/01/22 0723 04/01/22 1920   BP: 108/69 (!) 95/58 103/63 105/67   Pulse: 85 90 89 83   Resp: 17 17 17 19   Temp: 36.4 °C (97.6 °F) 36.4 °C (97.5 °F) 37 °C (98.6 °F) 36.8 °C (98.2 °F)   TempSrc: Temporal Temporal Temporal Temporal   SpO2: 96% 96% 96% 93%   Weight:       Height:           Intake/Output Summary (Last 24 hours) at 4/2/2022 0545  Last data filed at 4/2/2022 0400  Gross per 24 hour   Intake 499.9 ml   Output 700 ml   Net -200.1 ml       PHYSICAL EXAM:   General: No acute distress, afebrile, resting comfortably, cachectic  HEENT: NC/AT. EOMI.   Cardiovascular: RRR without murmurs, rubs, heaves. Normal capillary refill   Respiratory: Transmitted coarse upper airway sounds. No tachypnea or retractions   Abdomen: ascites, no tenderness  EXT:  MAY, 2+ edema to the knees  Skin: No erythema/ general pallor present, healing skin breakdown present  Neuro: Non-focal, alert and orientated       LABS:  Recent Labs     03/31/22 0338 04/01/22  0157   WBC 5.6 6.0   RBC 2.58* 2.58*   HEMOGLOBIN 8.6* 8.6*   HEMATOCRIT 27.0* 26.4*   .7* 102.3*   MCH 33.3* 33.3*   RDW 52.8* 52.9*   PLATELETCT 224 219   MPV 9.5 9.6     Recent Labs     03/31/22 0338 04/01/22  0157 04/02/22  0453   SODIUM 140 140 139   POTASSIUM 3.3* 3.9 4.0   CHLORIDE 105 106 104   CO2 30 28 27   BUN 21 20 23*   CREATININE 0.71 0.58 0.52   CALCIUM 8.3* 7.7* 8.1*   MAGNESIUM 1.9 1.9 2.0   PHOSPHORUS 2.1* 2.1* 2.9   ALBUMIN 2.1* 2.2* 2.2*     Estimated GFR/CRCL = Estimated Creatinine Clearance: 130.6  mL/min (by C-G formula based on SCr of 0.52 mg/dL).  Recent Labs     03/31/22  0338 04/01/22  0157 04/02/22 0453   GLUCOSE 153* 152* 100*     Recent Labs     03/31/22 0338 04/01/22 0157 04/02/22 0453   ASTSGOT 18 21 18   ALTSGPT 9 8 7   TBILIRUBIN 0.7 0.6 0.7   ALKPHOSPHAT 74 85 87   GLOBULIN 3.5 3.5 3.9*             No results for input(s): INR, APTT, FIBRINOGEN in the last 72 hours.    Invalid input(s): DIMER    IMAGING:   DX-ABDOMEN FOR TUBE PLACEMENT   Final Result         1.  Nonspecific bowel gas pattern.   2.  Dobbhoff tube tip terminates overlying the expected location of the gastric antrum or pylorus.   3.  Pulmonary infiltrates and/or edema.   4.  Layering bilateral pleural effusions      DX-ABDOMEN FOR TUBE PLACEMENT   Final Result         1.  Nonspecific bowel gas pattern.   2.  Dobbhoff tube is coiled within the stomach, the tip terminates overlying the expected location of the gastric fundus.   3.  Pulmonary edema and/or infiltrates with probable component of bilateral pleural effusion.      CT-CHEST,ABDOMEN,PELVIS WITH   Final Result      1.  Cirrhosis and very large volume ascites, extending into a large hiatal hernia. In the chest, it exhibits mass effect on the heart.   2.  Large left and moderate right pleural effusions and associated atelectasis.            EC-ECHOCARDIOGRAM COMPLETE W/O CONT   Final Result      US-ABDOMEN COMPLETE SURVEY   Final Result         1.  Cirrhotic liver with marked amount of ascites throughout the abdomen and pelvis.      DX-CHEST-LIMITED (1 VIEW)   Final Result         Soft tissue prominence seen along the right heart border could relate to dilated cardiac chamber. A mass cannot be excluded. Further evaluation with CT recommended.      CT-HEAD W/O   Final Result      1. No CT evidence of acute infarct, hemorrhage or mass.   2. Moderate global parenchymal atrophy. Chronic small vessel ischemic changes.          CULTURES:   Results     Procedure Component Value  Units Date/Time    FLUID CULTURE W/GRAM STAIN [[id number]] Collected: 03/26/22 1725    Order Status: Completed Specimen: Body Fluid Updated: 03/29/22 0854     Significant Indicator NEG     Source BF     Site Paracentesis     Culture Result No growth at 72 hours.     Gram Stain Result No organisms seen.    GRAM STAIN [[id number]] Collected: 03/26/22 1725    Order Status: Completed Specimen: Body Fluid Updated: 03/27/22 1307     Significant Indicator .     Source BF     Site Paracentesis     Gram Stain Result No organisms seen.    FLUID CULTURE W/GRAM STAIN [[id number]] Collected: 03/26/22 1725    Order Status: Canceled Specimen: Other from [facility] Fluid           MEDS:  Current Facility-Administered Medications   Medication Last Admin   • MD ALERT...adult comfort care     • atropine 1 % ophthalmic solution 2 Drop     • artificial tears ophthalmic solution 2 Drop     • artificial tears (EYE LUBRICANT) ophth ointment 1 Application     • scopolamine (TRANSDERM-SCOP) patch 1 Patch 1 Patch at 04/01/22 1658   • ondansetron (ZOFRAN ODT) dispertab 8 mg      Or   • ondansetron (ZOFRAN) syringe/vial injection 8 mg     • haloperidol (HALDOL) 2 MG/ML solution 1 mg      Or   • haloperidol lactate (HALDOL) injection 1 mg     • acetaminophen (Tylenol) tablet 650 mg      Or   • acetaminophen (TYLENOL) suppository 650 mg     • morphine (pf) 10 mg/mL injection 5 mg 5 mg at 04/02/22 0358   • morphine (pf) 10 mg/mL injection 10 mg 10 mg at 04/01/22 1659   • LORazepam (ATIVAN) 2 MG/ML oral conc 1 mg      Or   • LORazepam (ATIVAN) injection 1 mg 1 mg at 04/02/22 0358       ASSESSMENT/PLAN: Kris Ambriz is a 76 y.o. male with hypertension, dyslipidemia, and GERD who presents after a ground-level fall at home. Pt has newly diagnosed cirrhosis of the liver and severe malnutrition. Now on comfort care, transitioning to hospice.     # Cirrhosis   # GOC  Progressive debility, weakness, fatigue and poor PO intake leading up to this hospitalization.  Cirrhosis diagnosed based on imaging (RUQ US and CT abdomen) as well as physical exam. He expresses desires to not undergo invasive procedures, MRI scans and has refused IV and feeding tube over the course of this hospitalization. He has opted to initiate comfort care.     Comfort care orders placed.  Family in contact with palliative  Hospice consultation placed  Feeding tube okay per patient, feeds decreased to mitigate secretions. No NGT in place this morning. No replacement indicated      Disposition: Comfort care, discharge plan pending, home with hospice vs. Facility with hospice    Nicole Aranda MD   PGY-2 Family Medicine Resident   [facility]

## 2022-04-03 NOTE — DISCHARGE SUMMARY
[facility] DISCHARGE SUMMARY     PATIENT ID:  Name:             Kris Ambriz   YOB: 1945  Age:                 76 y.o.  male   MRN:               [id number]  Address:         [address] [place] [address] [place] [address]  Phone:            [phone] (home)    ADMISSION DATE:   3/24/2022    DISCHARGE DATE:   4/3/2022    DISCHARGE DIAGNOSES:   Cirrhosis   Anemia  Severe malnutrition    ATTENDING PHYSICIAN:   Shane Serrano    RESIDENT:   Nicole Aranda MD   PGY-2     CONSULTANTS:    Palliative    PROCEDURES:    Paracentesis    IMAGING:   DX-ABDOMEN FOR TUBE PLACEMENT   Final Result         1.  Nonspecific bowel gas pattern.   2.  Dobbhoff tube tip terminates overlying the expected location of the gastric antrum or pylorus.   3.  Pulmonary infiltrates and/or edema.   4.  Layering bilateral pleural effusions      DX-ABDOMEN FOR TUBE PLACEMENT   Final Result         1.  Nonspecific bowel gas pattern.   2.  Dobbhoff tube is coiled within the stomach, the tip terminates overlying the expected location of the gastric fundus.   3.  Pulmonary edema and/or infiltrates with probable component of bilateral pleural effusion.      CT-CHEST,ABDOMEN,PELVIS WITH   Final Result      1.  Cirrhosis and very large volume ascites, extending into a large hiatal hernia. In the chest, it exhibits mass effect on the heart.   2.  Large left and moderate right pleural effusions and associated atelectasis.            EC-ECHOCARDIOGRAM COMPLETE W/O CONT   Final Result      US-ABDOMEN COMPLETE SURVEY   Final Result         1.  Cirrhotic liver with marked amount of ascites throughout the abdomen and pelvis.      DX-CHEST-LIMITED (1 VIEW)   Final Result         Soft tissue prominence seen along the right heart border could relate to dilated cardiac chamber. A mass cannot be excluded. Further evaluation with CT recommended.      CT-HEAD W/O   Final Result      1. No CT evidence of acute infarct, hemorrhage or mass.   2.  Moderate global parenchymal atrophy. Chronic small vessel ischemic changes.            HOSPITAL COURSE:   Kris Ambriz is a 76 y.o. male who presented with general weakness and debility after a GLF, diagnosed with cirrhosis secondary to long history of alcohol use and severe malnutrition secondary to anorexia and dysphagia. He was appropriately made comfort care as he did not wish to undergo procedures or have feeding tubes and his goal was to be made comfortable.     Mr Ambriz passed away on 22 at 1805 and was pronounced by 2 RNs.      DISPOSITION:             CC:   Kris Horn M.D.

## 2022-04-03 NOTE — DISCHARGE PLANNING
MSW received call from bedside RN. Pt has now passed and the family is requesting mortuary information. MSW met with pt's family and went over mortuary information and next steps. No other needs at this time. Will call the NAM when they have made their choice.

## 2022-04-03 NOTE — PROGRESS NOTES
2 RN verification of death at 1805 with this RN and Melanie RN. Codie Arrieta notified and social work at bedside with family.

## 2023-02-19 RX ORDER — SIMVASTATIN 20 MG
TABLET ORAL
Qty: 90 TABLET | Refills: 3 | OUTPATIENT
Start: 2023-02-19